# Patient Record
Sex: FEMALE | Race: ASIAN | NOT HISPANIC OR LATINO | Employment: FULL TIME | URBAN - METROPOLITAN AREA
[De-identification: names, ages, dates, MRNs, and addresses within clinical notes are randomized per-mention and may not be internally consistent; named-entity substitution may affect disease eponyms.]

---

## 2023-09-08 ENCOUNTER — HOSPITAL ENCOUNTER (OUTPATIENT)
Dept: DATA CONVERSION | Facility: HOSPITAL | Age: 63
Discharge: HOME | End: 2023-09-08
Payer: COMMERCIAL

## 2023-09-08 DIAGNOSIS — Z00.00 ENCOUNTER FOR GENERAL ADULT MEDICAL EXAMINATION WITHOUT ABNORMAL FINDINGS: ICD-10-CM

## 2023-09-08 DIAGNOSIS — I10 ESSENTIAL (PRIMARY) HYPERTENSION: ICD-10-CM

## 2023-09-08 DIAGNOSIS — E78.00 PURE HYPERCHOLESTEROLEMIA, UNSPECIFIED: ICD-10-CM

## 2023-09-08 LAB
ALBUMIN SERPL-MCNC: 4.2 GM/DL (ref 3.5–5)
ALBUMIN/GLOB SERPL: 1.2 RATIO (ref 1.5–3)
ALP BLD-CCNC: 95 U/L (ref 35–125)
ALT SERPL-CCNC: 52 U/L (ref 5–40)
ANION GAP SERPL CALCULATED.3IONS-SCNC: 13 MMOL/L (ref 0–19)
APPEARANCE PLAS: ABNORMAL
AST SERPL-CCNC: 37 U/L (ref 5–40)
BACTERIA UR QL AUTO: NEGATIVE
BASOPHILS # BLD AUTO: 0.09 K/UL (ref 0–0.22)
BASOPHILS NFR BLD AUTO: 1.2 % (ref 0–1)
BILIRUB SERPL-MCNC: 0.5 MG/DL (ref 0.1–1.2)
BILIRUB UR QL STRIP.AUTO: NEGATIVE
BUN SERPL-MCNC: 17 MG/DL (ref 8–25)
BUN/CREAT SERPL: 21.3 RATIO (ref 8–21)
CALCIUM SERPL-MCNC: 9.4 MG/DL (ref 8.5–10.4)
CHLORIDE SERPL-SCNC: 104 MMOL/L (ref 97–107)
CHOLEST SERPL-MCNC: 143 MG/DL (ref 133–200)
CHOLEST/HDLC SERPL: 3.3 RATIO
CLARITY UR: CLEAR
CO2 SERPL-SCNC: 25 MMOL/L (ref 24–31)
COLOR SPUN FLD: ABNORMAL
COLOR UR: ABNORMAL
CREAT SERPL-MCNC: 0.8 MG/DL (ref 0.4–1.6)
DEPRECATED RDW RBC AUTO: 43.3 FL (ref 37–54)
DIFFERENTIAL METHOD BLD: ABNORMAL
EOSINOPHIL # BLD AUTO: 0.31 K/UL (ref 0–0.45)
EOSINOPHIL NFR BLD: 4.2 % (ref 0–3)
ERYTHROCYTE [DISTWIDTH] IN BLOOD BY AUTOMATED COUNT: 13 % (ref 11.7–15)
FASTING STATUS PATIENT QL REPORTED: ABNORMAL
GFR SERPL CREATININE-BSD FRML MDRD: 84 ML/MIN/1.73 M2
GLOBULIN SER-MCNC: 3.6 G/DL (ref 1.9–3.7)
GLUCOSE SERPL-MCNC: 90 MG/DL (ref 65–99)
GLUCOSE UR STRIP.AUTO-MCNC: NEGATIVE MG/DL
HCT VFR BLD AUTO: 38.4 % (ref 36–44)
HDLC SERPL-MCNC: 44 MG/DL
HGB BLD-MCNC: 12.6 GM/DL (ref 12–15)
HGB UR QL STRIP.AUTO: 1 /HPF (ref 0–3)
HGB UR QL: NEGATIVE
HYALINE CASTS UR QL AUTO: ABNORMAL /LPF
IMM GRANULOCYTES # BLD AUTO: 0.02 K/UL (ref 0–0.1)
KETONES UR QL STRIP.AUTO: NEGATIVE
LDLC SERPL CALC-MCNC: 51 MG/DL (ref 65–130)
LEUKOCYTE ESTERASE UR QL STRIP.AUTO: NEGATIVE
LYMPHOCYTES # BLD AUTO: 2.8 K/UL (ref 1.2–3.2)
LYMPHOCYTES NFR BLD MANUAL: 37.7 % (ref 20–40)
MCH RBC QN AUTO: 30 PG (ref 26–34)
MCHC RBC AUTO-ENTMCNC: 32.8 % (ref 31–37)
MCV RBC AUTO: 91.4 FL (ref 80–100)
MICROSCOPIC (UA): ABNORMAL
MONOCYTES # BLD AUTO: 0.71 K/UL (ref 0–0.8)
MONOCYTES NFR BLD MANUAL: 9.6 % (ref 0–8)
NEUTROPHILS # BLD AUTO: 3.5 K/UL
NEUTROPHILS # BLD AUTO: 3.5 K/UL (ref 1.8–7.7)
NEUTROPHILS.IMMATURE NFR BLD: 0.3 % (ref 0–1)
NEUTS SEG NFR BLD: 47 % (ref 50–70)
NITRITE UR QL STRIP.AUTO: NEGATIVE
NRBC BLD-RTO: 0 /100 WBC
PH UR STRIP.AUTO: 6 [PH] (ref 4.6–8)
PLATELET # BLD AUTO: 193 K/UL (ref 150–450)
PMV BLD AUTO: 13.2 CU (ref 7–12.6)
POTASSIUM SERPL-SCNC: 4.1 MMOL/L (ref 3.4–5.1)
PROT SERPL-MCNC: 7.8 G/DL (ref 5.9–7.9)
PROT UR STRIP.AUTO-MCNC: ABNORMAL MG/DL
RBC # BLD AUTO: 4.2 M/UL (ref 4–4.9)
SODIUM SERPL-SCNC: 142 MMOL/L (ref 133–145)
SP GR UR STRIP.AUTO: 1.02 (ref 1–1.03)
SQUAMOUS UR QL AUTO: ABNORMAL /HPF
TRIGL SERPL-MCNC: 240 MG/DL (ref 40–150)
TSH SERPL DL<=0.05 MIU/L-ACNC: 1.64 MIU/L (ref 0.27–4.2)
URINE CULTURE: ABNORMAL
UROBILINOGEN UR QL STRIP.AUTO: NORMAL MG/DL (ref 0–1)
WBC # BLD AUTO: 7.4 K/UL (ref 4.5–11)
WBC #/AREA URNS AUTO: 2 /HPF (ref 0–3)

## 2023-09-15 ENCOUNTER — HOSPITAL ENCOUNTER (OUTPATIENT)
Dept: DATA CONVERSION | Facility: HOSPITAL | Age: 63
Discharge: HOME | End: 2023-09-15
Payer: COMMERCIAL

## 2023-09-15 DIAGNOSIS — Z11.51 ENCOUNTER FOR SCREENING FOR HUMAN PAPILLOMAVIRUS (HPV): ICD-10-CM

## 2023-09-25 ENCOUNTER — HOSPITAL ENCOUNTER (OUTPATIENT)
Dept: DATA CONVERSION | Facility: HOSPITAL | Age: 63
Discharge: HOME | End: 2023-09-25
Payer: COMMERCIAL

## 2023-09-25 DIAGNOSIS — Z12.31 ENCOUNTER FOR SCREENING MAMMOGRAM FOR MALIGNANT NEOPLASM OF BREAST: ICD-10-CM

## 2024-01-12 ENCOUNTER — OFFICE VISIT (OUTPATIENT)
Dept: CARDIOLOGY | Facility: CLINIC | Age: 64
End: 2024-01-12
Payer: COMMERCIAL

## 2024-01-12 VITALS
WEIGHT: 140 LBS | BODY MASS INDEX: 25.76 KG/M2 | SYSTOLIC BLOOD PRESSURE: 149 MMHG | HEIGHT: 62 IN | OXYGEN SATURATION: 98 % | DIASTOLIC BLOOD PRESSURE: 73 MMHG | RESPIRATION RATE: 18 BRPM | TEMPERATURE: 98.6 F | HEART RATE: 64 BPM

## 2024-01-12 DIAGNOSIS — I10 PRIMARY HYPERTENSION: Primary | ICD-10-CM

## 2024-01-12 PROBLEM — E78.00 HYPERCHOLESTEROLEMIA: Status: ACTIVE | Noted: 2019-02-15

## 2024-01-12 PROBLEM — E23.6 EMPTY SELLA (MULTI): Status: ACTIVE | Noted: 2022-01-11

## 2024-01-12 PROBLEM — E88.09 HYPERPROTEINEMIA: Status: ACTIVE | Noted: 2020-05-13

## 2024-01-12 PROBLEM — R73.01 IMPAIRED FASTING GLUCOSE: Status: ACTIVE | Noted: 2022-09-06

## 2024-01-12 PROBLEM — S16.1XXA CERVICAL STRAIN: Status: ACTIVE | Noted: 2024-01-12

## 2024-01-12 PROBLEM — I25.10 ARTERIOSCLEROSIS OF CORONARY ARTERY: Status: ACTIVE | Noted: 2022-09-14

## 2024-01-12 PROBLEM — E23.6 EMPTY SELLA (MULTI): Status: RESOLVED | Noted: 2022-01-11 | Resolved: 2024-01-12

## 2024-01-12 PROBLEM — M54.6 THORACIC BACK PAIN: Status: ACTIVE | Noted: 2021-01-29

## 2024-01-12 PROBLEM — S06.0X0A CONCUSSION WITHOUT LOSS OF CONSCIOUSNESS: Status: ACTIVE | Noted: 2022-01-11

## 2024-01-12 PROBLEM — S00.03XA SCALP CONTUSION: Status: ACTIVE | Noted: 2024-01-12

## 2024-01-12 PROBLEM — R94.6 ABNORMAL THYROID FUNCTION TEST: Status: ACTIVE | Noted: 2022-01-11

## 2024-01-12 PROBLEM — M19.90 INFLAMMATORY ARTHRITIS: Status: ACTIVE | Noted: 2024-01-12

## 2024-01-12 PROBLEM — S00.03XA CONTUSION OF SCALP: Status: ACTIVE | Noted: 2024-01-12

## 2024-01-12 PROBLEM — S09.90XA CLOSED HEAD INJURY: Status: ACTIVE | Noted: 2024-01-12

## 2024-01-12 PROBLEM — S16.1XXA STRAIN OF NECK MUSCLE: Status: ACTIVE | Noted: 2024-01-12

## 2024-01-12 PROCEDURE — 3077F SYST BP >= 140 MM HG: CPT | Performed by: INTERNAL MEDICINE

## 2024-01-12 PROCEDURE — 1036F TOBACCO NON-USER: CPT | Performed by: INTERNAL MEDICINE

## 2024-01-12 PROCEDURE — 3078F DIAST BP <80 MM HG: CPT | Performed by: INTERNAL MEDICINE

## 2024-01-12 PROCEDURE — 99203 OFFICE O/P NEW LOW 30 MIN: CPT | Performed by: INTERNAL MEDICINE

## 2024-01-12 PROCEDURE — 93000 ELECTROCARDIOGRAM COMPLETE: CPT | Performed by: INTERNAL MEDICINE

## 2024-01-12 RX ORDER — AMLODIPINE BESYLATE 5 MG/1
1 TABLET ORAL DAILY
COMMUNITY
Start: 2020-03-27 | End: 2024-01-12 | Stop reason: ALTCHOICE

## 2024-01-12 RX ORDER — CHOLECALCIFEROL (VITAMIN D3) 25 MCG
1000 TABLET ORAL DAILY
COMMUNITY
Start: 2023-06-05 | End: 2024-01-12 | Stop reason: ALTCHOICE

## 2024-01-12 RX ORDER — LOSARTAN POTASSIUM 100 MG/1
100 TABLET ORAL DAILY
Qty: 90 TABLET | Refills: 3 | Status: SHIPPED | OUTPATIENT
Start: 2024-01-12 | End: 2025-01-11

## 2024-01-12 RX ORDER — LOSARTAN POTASSIUM 50 MG/1
50 TABLET ORAL DAILY
COMMUNITY
Start: 2023-06-06 | End: 2024-01-12 | Stop reason: SDUPTHER

## 2024-01-12 RX ORDER — SPIRONOLACTONE 100 MG/1
100 TABLET, FILM COATED ORAL DAILY
COMMUNITY
Start: 2020-03-27 | End: 2024-01-12 | Stop reason: ALTCHOICE

## 2024-01-12 RX ORDER — ROSUVASTATIN CALCIUM 40 MG/1
1 TABLET, COATED ORAL DAILY
COMMUNITY
Start: 2022-09-14

## 2024-01-12 RX ORDER — MULTIVIT-MIN/IRON/FOLIC ACID/K 18-600-40
CAPSULE ORAL
COMMUNITY
End: 2024-01-12 | Stop reason: ALTCHOICE

## 2024-01-12 RX ORDER — METOPROLOL SUCCINATE 50 MG/1
1 TABLET, EXTENDED RELEASE ORAL DAILY
COMMUNITY

## 2024-01-12 RX ORDER — ASPIRIN 81 MG/1
81 TABLET ORAL DAILY
COMMUNITY
Start: 2020-05-13 | End: 2024-01-12 | Stop reason: ALTCHOICE

## 2024-01-12 ASSESSMENT — PAIN SCALES - GENERAL: PAINLEVEL: 0-NO PAIN

## 2024-01-12 ASSESSMENT — PATIENT HEALTH QUESTIONNAIRE - PHQ9
2. FEELING DOWN, DEPRESSED OR HOPELESS: NOT AT ALL
SUM OF ALL RESPONSES TO PHQ9 QUESTIONS 1 AND 2: 0
1. LITTLE INTEREST OR PLEASURE IN DOING THINGS: NOT AT ALL

## 2024-01-12 NOTE — PROGRESS NOTES
History of present illness:  Very pleasant 63-year-old female with history of hypertension, mild coronary artery disease based on coronary calcium score that she had in 2021 which she had total score of 60.  Patient presents to my office for hypertension.  Patient has been complaining of headaches intermittently and her blood pressure has been lately high.  She sees Dr. Lio Buenrostro as her cardiologist.  Patient denies having chest pain or shortness of breath with activity  Past Medical History:   Diagnosis Date    Abnormal thyroid function test 01/11/2022    Arteriosclerosis of coronary artery 09/14/2022    Hypercholesterolemia 02/15/2019    Hyperproteinemia 05/13/2020    Hypertension 02/15/2019       Past Surgical History:   Procedure Laterality Date    CATARACT EXTRACTION Bilateral        Allergies   Allergen Reactions    Latex Rash     Review of systems.  10 point review of systems otherwise negative     reports that she has never smoked. She has never been exposed to tobacco smoke. She has never used smokeless tobacco. She reports that she does not drink alcohol and does not use drugs.    Family History   Problem Relation Name Age of Onset    Heart disease Mother      Heart disease Father         Patient's Medications   New Prescriptions    No medications on file   Previous Medications    LOSARTAN (COZAAR) 50 MG TABLET    Take 1 tablet (50 mg) by mouth once daily.    METOPROLOL SUCCINATE XL (TOPROL-XL) 50 MG 24 HR TABLET    Take 1 tablet (50 mg) by mouth once daily.    ROSUVASTATIN (CRESTOR) 40 MG TABLET    Take 1 tablet (40 mg) by mouth once daily.   Modified Medications    No medications on file   Discontinued Medications    AMLODIPINE (NORVASC) 5 MG TABLET    Take 1 tablet (5 mg) by mouth once daily.    ASCORBIC ACID, VITAMIN C, 500 MG CAPSULE    Take by mouth.    ASPIRIN (ASPIR-LOW) 81 MG EC TABLET    Take 1 tablet (81 mg) by mouth once daily.    CALCIUM CARB/VITAMIN D3/VIT K1 (CALCIUM SOFT CHEW ORAL)     Take by mouth as directed    CHOLECALCIFEROL (VITAMIN D3) 25 MCG (1000 UT) TABLET    Take 1 tablet (1,000 Units) by mouth once daily.    SPIRONOLACTONE (ALDACTONE) 100 MG TABLET    Take 1 tablet (100 mg) by mouth once daily.       Objective   Physical Exam  General: Patient in no acute distress   HEENT: Atraumatic normocephalic.  Neck: Supple, jugular venous pressure within normal limit.  No bruits  Lungs: Clear to auscultation bilaterally  Cardiovascular: Regular rate and rhythm, normal heart sounds, no murmurs rubs or gallops  Abdomen: Soft nontender nondistended.  Normal bowel sounds.  Extremities: Warm to touch, no edema.    Lab Review   No visits with results within 2 Month(s) from this visit.   Latest known visit with results is:   [facility] Encounter on 09/15/2023   Component Date Value    CONVERTED FINAL DIAGNOSIS 09/15/2023                      Value:  SPECIMEN ADEQUACY:       Satisfactory for evaluation.         GENERAL CATEGORIZATION:       Negative for intraepithelial lesion or malignancy.            See interpretation-result.              INTERPRETATION/RESULT:      Negative for intraepithelial lesion or malignancy.           Cellular changes associated with atrophy are present.         The above diagnosis was rendered at [facility]  [address] [place] [address], CLIA number [id number].   This information is included on the report as part of a CLIA  requirement.             TONO Christine(ASCP)  09/25/2023 10:53  [facility] - 09/25/2023  Screened By:Inge Salazar M.D.      CONVERTED COMMENT 09/15/2023                      Value:The Pap test is only a screening test for cervical cancer. As with all  screening tests, false-negative results can occur, emphasizing the need  for ongoing surveillance and clinical correlation. If there are any  questions about the results of this screening test, please call the  pathology laboratory.      CONVERTED CLINICAL CYTOL* 09/15/2023                       Value:CLINICAL HISTORY:  Automatic HPV at Clinicians' Request      CONVERTED INTERPRETATION 09/15/2023                      Value:  HPV High Risk Panel & Genotyping:    Analyte                       Analysis         HPV 16 (High Risk)               NEGATIVE    HPV 18 (High Risk)               NEGATIVE    *Other HPV (High Risk)          NEGATIVE    *Other high risk types include 31, 33, 35, 39, 45, 51, 52, 56, 58, 59,  66, and 68.    Expected value Negative for all analytes.    Date:     9/20/2023         Disclaimer:  Negative Result does not preclude the presence of HPV infection because  results are dependent on adequate specimen collection, absence of  inhibitors and sufficient DNA to be detected.    The Roche Jeremy HPV Assay is a FDA cleared diagnostic test.   [facility] is authorized under CLIA to perform high complexity  testing.    TONO Christine([facility])  09/25/2023 10:54          Assessment/Plan   Patient Active Problem List   Diagnosis    Abnormal thyroid function test    Arteriosclerosis of coronary artery    Closed head injury    Concussion without loss of consciousness    Contusion of scalp    Hyperproteinemia    Hypercholesterolemia    Hypertension    Inflammatory arthritis    Strain of neck muscle    Thoracic back pain    Scalp contusion    Impaired fasting glucose    Cervical strain    Body mass index (BMI) 24.0-24.9, adult          Very pleasant 63-year-old female with history of hypertension, mild coronary artery disease based on coronary calcium score that she had in 2021 which she had total score of 60.  Patient presents to my office for hypertension.  Patient has been complaining of headaches intermittently and her blood pressure has been lately high.  She sees Dr. Lio Buenrostro as her cardiologist.  Patient denies having chest pain or shortness of breath with activity only problem she has been having intermittent headaches and hypertension.  At this point patient  currently on losartan 50 mg oral daily and addition of metoprolol.  EKG showing normal sinus rhythm nonspecific ST-T wave abnormalities.  I will increase her losartan to 100 mg oral daily.  If she remains hypertensive then we will add hydrochlorothiazide 12.5 or 25 mg.  She will call me back with the blood pressure reading in a week.  I will also refer to neurology for evaluation.  Patient will follow-up with Dr. Buenrostro in few months    Frederick Martinez MD

## 2024-01-17 ENCOUNTER — TELEPHONE (OUTPATIENT)
Dept: OPHTHALMOLOGY | Facility: CLINIC | Age: 64
End: 2024-01-17
Payer: COMMERCIAL

## 2024-01-17 NOTE — TELEPHONE ENCOUNTER
REFERRAL RECEIVED FROM NEURO. NOTES TO CLD FOR PROPER SCHEDULING-SHAQ      BUSY SIGNAL ON HOME PHONE #, UNABLE TO LM ON MOBILE #. NEED TO SCHEDULE IOP/DIL PER CLD. RECORDS IN NEURO FOLDER IN DRAWER-SHAQ

## 2024-01-22 ENCOUNTER — OFFICE VISIT (OUTPATIENT)
Dept: OPHTHALMOLOGY | Facility: CLINIC | Age: 64
End: 2024-01-22
Payer: COMMERCIAL

## 2024-01-22 DIAGNOSIS — G44.89 OTHER HEADACHE SYNDROME: Primary | ICD-10-CM

## 2024-01-22 DIAGNOSIS — Z96.1 PSEUDOPHAKIA OF BOTH EYES: ICD-10-CM

## 2024-01-22 PROCEDURE — 99213 OFFICE O/P EST LOW 20 MIN: CPT | Performed by: OPHTHALMOLOGY

## 2024-01-22 PROCEDURE — 99203 OFFICE O/P NEW LOW 30 MIN: CPT | Performed by: OPHTHALMOLOGY

## 2024-01-22 ASSESSMENT — ENCOUNTER SYMPTOMS
ALLERGIC/IMMUNOLOGIC NEGATIVE: 0
HEMATOLOGIC/LYMPHATIC NEGATIVE: 0
RESPIRATORY NEGATIVE: 0
ENDOCRINE NEGATIVE: 0
NEUROLOGICAL NEGATIVE: 1
LOSS OF SENSATION IN FEET: 0
OCCASIONAL FEELINGS OF UNSTEADINESS: 0
PSYCHIATRIC NEGATIVE: 0
CONSTITUTIONAL NEGATIVE: 0
EYES NEGATIVE: 0
GASTROINTESTINAL NEGATIVE: 0
DEPRESSION: 0
CARDIOVASCULAR NEGATIVE: 0
MUSCULOSKELETAL NEGATIVE: 0

## 2024-01-22 ASSESSMENT — CUP TO DISC RATIO
OS_RATIO: 0.4
OD_RATIO: 0.5

## 2024-01-22 ASSESSMENT — EXTERNAL EXAM - LEFT EYE: OS_EXAM: NORMAL

## 2024-01-22 ASSESSMENT — TONOMETRY
OS_IOP_MMHG: 13
OD_IOP_MMHG: 12
IOP_METHOD: GOLDMANN APPLANATION

## 2024-01-22 ASSESSMENT — PATIENT HEALTH QUESTIONNAIRE - PHQ9
1. LITTLE INTEREST OR PLEASURE IN DOING THINGS: NOT AT ALL
SUM OF ALL RESPONSES TO PHQ9 QUESTIONS 1 AND 2: 0
2. FEELING DOWN, DEPRESSED OR HOPELESS: NOT AT ALL

## 2024-01-22 ASSESSMENT — PAIN SCALES - GENERAL: PAINLEVEL: 0-NO PAIN

## 2024-01-22 ASSESSMENT — VISUAL ACUITY
OS_SC: 20/20
OD_SC: 20/20
METHOD: SNELLEN - LINEAR

## 2024-01-22 ASSESSMENT — SLIT LAMP EXAM - LIDS
COMMENTS: NORMAL
COMMENTS: NORMAL

## 2024-01-22 ASSESSMENT — EXTERNAL EXAM - RIGHT EYE: OD_EXAM: NORMAL

## 2024-01-22 NOTE — ASSESSMENT & PLAN NOTE
Stable appearing intraocular lens (IOL) both eyes (OU) with well treated PCO after YAG. Will continue to monitor with serial exam.

## 2024-01-22 NOTE — PATIENT INSTRUCTIONS
Thank you so much for choosing me to provide your care today!    If you were dilated your vision may remain blurry   or light sensitive for several hours.    The nature of eye and vision problems can require frequent follow up, please make every effort to adhere to any future appointments.    If you have any issues, questions, or concerns,   please do not hesitate to reach out.    If you receive a survey in regards to your care today, please mention any exceptional care my office staff and/or technicians provided.    You can reach our office at this number:  [phone]

## 2024-01-22 NOTE — ASSESSMENT & PLAN NOTE
No obvious ocular etiology or manifestations of HA. No signs of papilledema on exam. Advised to continue close follow up with neurology.

## 2024-01-22 NOTE — LETTER
"January 22, 2024    Mike Davis MD  [address]  [address]  [facility] [address]    Patient: Shante Nelson   YOB: 1960   Date of Visit: 1/22/2024       Dear Dr. Mike Davis MD:    Thank you for referring Shante Nelson to me for evaluation. Here is my assessment and plan of care:    Assessment/Plan:  Shante was seen today for new patient visit.  Diagnoses and all orders for this visit:  Other headache syndrome (Primary)  Pseudophakia of both eyes    No signs of papilledema on exam. No obvious ocular etiology for headache. Will have follow with you as scheduled.     Below you can find relevant pieces of the exam. If you have questions, please do not hesitate to call me. I look forward to following Shante along with you.         Sincerely,        Ray Leroy MD        CC:   No Recipients         External Exam         Right Left    External Normal Normal              Slit Lamp Exam         Right Left    Lids/Lashes Normal Normal    Conjunctiva/Sclera White and quiet White and quiet    Cornea Clear Clear    Anterior Chamber Deep and quiet Deep and quiet    Iris Round and reactive Round and reactive    Lens Posterior chamber intraocular lens, Open posterior capsule Posterior chamber intraocular lens, Open posterior capsule              Fundus Exam         Right Left    Vitreous Normal Normal    Disc Normal Normal    C/D Ratio 0.5 0.4    Macula Normal Normal    Vessels Normal Normal    Periphery Normal Temporal Abnormal pigmentation, spotted hyperpigmentation                   <div id=\"MAIN_EXAM_REVIEWED\"></div>     "

## 2024-01-22 NOTE — PROGRESS NOTES
Assessment/Plan   Problem List Items Addressed This Visit          Eye/Vision problems    Pseudophakia of both eyes     Stable appearing intraocular lens (IOL) both eyes (OU) with well treated PCO after YAG. Will continue to monitor with serial exam.             Other    Other headache syndrome - Primary     No obvious ocular etiology or manifestations of HA. No signs of papilledema on exam. Advised to continue close follow up with neurology.             Provided reassurance regarding above diagnoses and care received in the office visit today. Discussed outcomes and options along with the importance of treatment compliance. Understands the importance of any follow up visits. Patient instructed to call/communicate with our office if any new issues, questions, or concerns.     Will plan to see back in 1 year for full or sooner PRN

## 2024-08-19 ENCOUNTER — TELEPHONE (OUTPATIENT)
Dept: PRIMARY CARE | Facility: CLINIC | Age: 64
End: 2024-08-19
Payer: COMMERCIAL

## 2024-08-19 DIAGNOSIS — R94.6 ABNORMAL THYROID FUNCTION TEST: ICD-10-CM

## 2024-08-19 DIAGNOSIS — R73.01 IMPAIRED FASTING GLUCOSE: ICD-10-CM

## 2024-08-19 DIAGNOSIS — Z00.00 ROUTINE MEDICAL EXAM: ICD-10-CM

## 2024-08-19 DIAGNOSIS — Z11.4 ENCOUNTER FOR SCREENING FOR HIV: ICD-10-CM

## 2024-08-19 DIAGNOSIS — I10 HYPERTENSION, UNSPECIFIED TYPE: ICD-10-CM

## 2024-08-19 DIAGNOSIS — E78.00 HYPERCHOLESTEROLEMIA: ICD-10-CM

## 2024-08-19 DIAGNOSIS — Z11.59 NEED FOR HEPATITIS C SCREENING TEST: ICD-10-CM

## 2024-10-14 ENCOUNTER — APPOINTMENT (OUTPATIENT)
Dept: PRIMARY CARE | Facility: CLINIC | Age: 64
End: 2024-10-14
Payer: COMMERCIAL

## 2024-10-18 ENCOUNTER — LAB (OUTPATIENT)
Dept: LAB | Facility: LAB | Age: 64
End: 2024-10-18
Payer: COMMERCIAL

## 2024-10-18 DIAGNOSIS — Z11.4 ENCOUNTER FOR SCREENING FOR HIV: ICD-10-CM

## 2024-10-18 DIAGNOSIS — E78.00 HYPERCHOLESTEROLEMIA: ICD-10-CM

## 2024-10-18 DIAGNOSIS — Z11.59 NEED FOR HEPATITIS C SCREENING TEST: ICD-10-CM

## 2024-10-18 DIAGNOSIS — I25.10 ARTERIOSCLEROSIS OF CORONARY ARTERY: ICD-10-CM

## 2024-10-18 DIAGNOSIS — Z00.00 ROUTINE MEDICAL EXAM: ICD-10-CM

## 2024-10-18 DIAGNOSIS — I10 HYPERTENSION, UNSPECIFIED TYPE: ICD-10-CM

## 2024-10-18 DIAGNOSIS — R94.6 ABNORMAL THYROID FUNCTION TEST: ICD-10-CM

## 2024-10-18 DIAGNOSIS — R73.01 IMPAIRED FASTING GLUCOSE: ICD-10-CM

## 2024-10-18 LAB
ALBUMIN SERPL BCP-MCNC: 4.3 G/DL (ref 3.4–5)
ALP SERPL-CCNC: 72 U/L (ref 33–136)
ALT SERPL W P-5'-P-CCNC: 49 U/L (ref 7–45)
ANION GAP SERPL CALC-SCNC: 11 MMOL/L (ref 10–20)
APPEARANCE UR: CLEAR
AST SERPL W P-5'-P-CCNC: 37 U/L (ref 9–39)
BASOPHILS # BLD AUTO: 0.09 X10*3/UL (ref 0–0.1)
BASOPHILS NFR BLD AUTO: 1 %
BILIRUB SERPL-MCNC: 0.6 MG/DL (ref 0–1.2)
BILIRUB UR STRIP.AUTO-MCNC: NEGATIVE MG/DL
BUN SERPL-MCNC: 22 MG/DL (ref 6–23)
CALCIUM SERPL-MCNC: 9.5 MG/DL (ref 8.6–10.3)
CHLORIDE SERPL-SCNC: 104 MMOL/L (ref 98–107)
CHOLEST SERPL-MCNC: 177 MG/DL (ref 0–199)
CHOLESTEROL/HDL RATIO: 3.6
CO2 SERPL-SCNC: 29 MMOL/L (ref 21–32)
COLOR UR: NORMAL
CREAT SERPL-MCNC: 0.96 MG/DL (ref 0.5–1.05)
CREAT UR-MCNC: 99.9 MG/DL (ref 20–320)
EGFRCR SERPLBLD CKD-EPI 2021: 67 ML/MIN/1.73M*2
EOSINOPHIL # BLD AUTO: 0.3 X10*3/UL (ref 0–0.7)
EOSINOPHIL NFR BLD AUTO: 3.3 %
ERYTHROCYTE [DISTWIDTH] IN BLOOD BY AUTOMATED COUNT: 12.6 % (ref 11.5–14.5)
EST. AVERAGE GLUCOSE BLD GHB EST-MCNC: 117 MG/DL
GLUCOSE SERPL-MCNC: 94 MG/DL (ref 74–99)
GLUCOSE UR STRIP.AUTO-MCNC: NORMAL MG/DL
HBA1C MFR BLD: 5.7 %
HCT VFR BLD AUTO: 41.8 % (ref 36–46)
HCV AB SER QL: NONREACTIVE
HDLC SERPL-MCNC: 48.5 MG/DL
HGB BLD-MCNC: 13.4 G/DL (ref 12–16)
HIV 1+2 AB+HIV1 P24 AG SERPL QL IA: NONREACTIVE
IMM GRANULOCYTES # BLD AUTO: 0.03 X10*3/UL (ref 0–0.7)
IMM GRANULOCYTES NFR BLD AUTO: 0.3 % (ref 0–0.9)
KETONES UR STRIP.AUTO-MCNC: NEGATIVE MG/DL
LDLC SERPL CALC-MCNC: 86 MG/DL
LEUKOCYTE ESTERASE UR QL STRIP.AUTO: NEGATIVE
LYMPHOCYTES # BLD AUTO: 3.76 X10*3/UL (ref 1.2–4.8)
LYMPHOCYTES NFR BLD AUTO: 41.7 %
MCH RBC QN AUTO: 30 PG (ref 26–34)
MCHC RBC AUTO-ENTMCNC: 32.1 G/DL (ref 32–36)
MCV RBC AUTO: 94 FL (ref 80–100)
MICROALBUMIN UR-MCNC: 24.6 MG/L
MICROALBUMIN/CREAT UR: 24.6 UG/MG CREAT
MONOCYTES # BLD AUTO: 0.74 X10*3/UL (ref 0.1–1)
MONOCYTES NFR BLD AUTO: 8.2 %
NEUTROPHILS # BLD AUTO: 4.09 X10*3/UL (ref 1.2–7.7)
NEUTROPHILS NFR BLD AUTO: 45.5 %
NITRITE UR QL STRIP.AUTO: NEGATIVE
NON HDL CHOLESTEROL: 129 MG/DL (ref 0–149)
NRBC BLD-RTO: 0 /100 WBCS (ref 0–0)
PH UR STRIP.AUTO: 6 [PH]
PLATELET # BLD AUTO: 212 X10*3/UL (ref 150–450)
POTASSIUM SERPL-SCNC: 4.2 MMOL/L (ref 3.5–5.3)
PROT SERPL-MCNC: 7.8 G/DL (ref 6.4–8.2)
PROT UR STRIP.AUTO-MCNC: NEGATIVE MG/DL
RBC # BLD AUTO: 4.47 X10*6/UL (ref 4–5.2)
RBC # UR STRIP.AUTO: NEGATIVE /UL
SODIUM SERPL-SCNC: 140 MMOL/L (ref 136–145)
SP GR UR STRIP.AUTO: 1.02
TRIGL SERPL-MCNC: 213 MG/DL (ref 0–149)
TSH SERPL-ACNC: 1.84 MIU/L (ref 0.44–3.98)
UROBILINOGEN UR STRIP.AUTO-MCNC: NORMAL MG/DL
VLDL: 43 MG/DL (ref 0–40)
WBC # BLD AUTO: 9 X10*3/UL (ref 4.4–11.3)

## 2024-10-18 PROCEDURE — 80061 LIPID PANEL: CPT

## 2024-10-18 PROCEDURE — 80053 COMPREHEN METABOLIC PANEL: CPT

## 2024-10-18 PROCEDURE — 84443 ASSAY THYROID STIM HORMONE: CPT

## 2024-10-18 PROCEDURE — 86803 HEPATITIS C AB TEST: CPT

## 2024-10-18 PROCEDURE — 82043 UR ALBUMIN QUANTITATIVE: CPT

## 2024-10-18 PROCEDURE — 87389 HIV-1 AG W/HIV-1&-2 AB AG IA: CPT

## 2024-10-18 PROCEDURE — 85025 COMPLETE CBC W/AUTO DIFF WBC: CPT

## 2024-10-18 PROCEDURE — 82570 ASSAY OF URINE CREATININE: CPT

## 2024-10-18 PROCEDURE — 83036 HEMOGLOBIN GLYCOSYLATED A1C: CPT

## 2024-10-18 PROCEDURE — 81003 URINALYSIS AUTO W/O SCOPE: CPT

## 2024-10-23 ENCOUNTER — TELEPHONE (OUTPATIENT)
Dept: PRIMARY CARE | Facility: CLINIC | Age: 64
End: 2024-10-23

## 2024-10-23 ENCOUNTER — APPOINTMENT (OUTPATIENT)
Dept: PRIMARY CARE | Facility: CLINIC | Age: 64
End: 2024-10-23
Payer: COMMERCIAL

## 2024-10-23 VITALS
HEART RATE: 74 BPM | WEIGHT: 142 LBS | DIASTOLIC BLOOD PRESSURE: 78 MMHG | SYSTOLIC BLOOD PRESSURE: 126 MMHG | OXYGEN SATURATION: 98 % | BODY MASS INDEX: 27.88 KG/M2 | HEIGHT: 60 IN

## 2024-10-23 DIAGNOSIS — Z12.11 SCREEN FOR COLON CANCER: ICD-10-CM

## 2024-10-23 DIAGNOSIS — I10 PRIMARY HYPERTENSION: ICD-10-CM

## 2024-10-23 DIAGNOSIS — E78.00 HYPERCHOLESTEROLEMIA: ICD-10-CM

## 2024-10-23 DIAGNOSIS — Z00.00 WELL ADULT EXAM: Primary | ICD-10-CM

## 2024-10-23 DIAGNOSIS — I25.10 ARTERIOSCLEROSIS OF CORONARY ARTERY: ICD-10-CM

## 2024-10-23 DIAGNOSIS — Z12.31 ENCOUNTER FOR SCREENING MAMMOGRAM FOR BREAST CANCER: ICD-10-CM

## 2024-10-23 PROBLEM — S06.0X0A CONCUSSION WITHOUT LOSS OF CONSCIOUSNESS: Status: RESOLVED | Noted: 2022-01-11 | Resolved: 2024-10-23

## 2024-10-23 PROBLEM — G44.89 OTHER HEADACHE SYNDROME: Status: RESOLVED | Noted: 2024-01-22 | Resolved: 2024-10-23

## 2024-10-23 PROBLEM — S16.1XXA STRAIN OF NECK MUSCLE: Status: RESOLVED | Noted: 2024-01-12 | Resolved: 2024-10-23

## 2024-10-23 PROBLEM — S00.03XA SCALP CONTUSION: Status: RESOLVED | Noted: 2024-01-12 | Resolved: 2024-10-23

## 2024-10-23 PROBLEM — R94.6 ABNORMAL THYROID FUNCTION TEST: Status: RESOLVED | Noted: 2022-01-11 | Resolved: 2024-10-23

## 2024-10-23 PROBLEM — S16.1XXA CERVICAL STRAIN: Status: RESOLVED | Noted: 2024-01-12 | Resolved: 2024-10-23

## 2024-10-23 PROBLEM — S00.03XA CONTUSION OF SCALP: Status: RESOLVED | Noted: 2024-01-12 | Resolved: 2024-10-23

## 2024-10-23 PROBLEM — M54.6 THORACIC BACK PAIN: Status: RESOLVED | Noted: 2021-01-29 | Resolved: 2024-10-23

## 2024-10-23 PROBLEM — R73.01 IMPAIRED FASTING GLUCOSE: Status: RESOLVED | Noted: 2022-09-06 | Resolved: 2024-10-23

## 2024-10-23 PROBLEM — S09.90XA CLOSED HEAD INJURY: Status: RESOLVED | Noted: 2024-01-12 | Resolved: 2024-10-23

## 2024-10-23 PROCEDURE — 99396 PREV VISIT EST AGE 40-64: CPT | Performed by: FAMILY MEDICINE

## 2024-10-23 PROCEDURE — 1036F TOBACCO NON-USER: CPT | Performed by: FAMILY MEDICINE

## 2024-10-23 PROCEDURE — 3008F BODY MASS INDEX DOCD: CPT | Performed by: FAMILY MEDICINE

## 2024-10-23 PROCEDURE — 3074F SYST BP LT 130 MM HG: CPT | Performed by: FAMILY MEDICINE

## 2024-10-23 PROCEDURE — 3078F DIAST BP <80 MM HG: CPT | Performed by: FAMILY MEDICINE

## 2024-10-23 RX ORDER — CHLORTHALIDONE 25 MG/1
12.5 TABLET ORAL DAILY
COMMUNITY
Start: 2024-07-30

## 2024-10-23 ASSESSMENT — ENCOUNTER SYMPTOMS
HEMATURIA: 0
DYSPHORIC MOOD: 0
COUGH: 0
ARTHRALGIAS: 0
DYSURIA: 0
TROUBLE SWALLOWING: 0
ABDOMINAL PAIN: 0
RHINORRHEA: 0
CHILLS: 0
UNEXPECTED WEIGHT CHANGE: 0
VOMITING: 0
SHORTNESS OF BREATH: 0
NAUSEA: 0
DIZZINESS: 0
BLOOD IN STOOL: 0
SORE THROAT: 0
NUMBNESS: 0
WEAKNESS: 0
NERVOUS/ANXIOUS: 0
FEVER: 0
MYALGIAS: 0

## 2024-10-23 ASSESSMENT — PAIN SCALES - GENERAL: PAINLEVEL_OUTOF10: 0-NO PAIN

## 2024-10-23 NOTE — PROGRESS NOTES
Subjective   Patient ID: Shante Nelson is a 64 y.o. female who presents for CPE.    HPI  Patient Care Team:  Charlene Matson MD as PCP - General (Family Medicine)  Charlene Matson MD as PCP - [facility] PCP  Ray Leroy MD as Surgeon (Ophthalmology)  Frederick Martinez MD as Referring Physician (Cardiology)    Shante Nelson is seen for comprehensive physical exam.  PMH, PSH, family history and social history were reviewed and updated.  GYN - Pap 2023 negative with negative HPV  ColoGuard 2016 negative  HTN - stable on current medication, no chest pain or claudication, stress test by Dr. Buenrostro 8/2022 negative  Hypercholesterolemia - tolerating medication without side effects    Review of Systems   Constitutional:  Negative for chills, fever and unexpected weight change.   HENT:  Negative for congestion, ear pain, hearing loss, rhinorrhea, sore throat and trouble swallowing.    Eyes:  Negative for visual disturbance.   Respiratory:  Negative for cough and shortness of breath.    Cardiovascular:  Negative for chest pain and leg swelling.   Gastrointestinal:  Negative for abdominal pain, blood in stool, nausea and vomiting.   Genitourinary:  Negative for dysuria, hematuria, vaginal bleeding and vaginal discharge.   Musculoskeletal:  Negative for arthralgias and myalgias.   Skin:  Negative for rash.   Neurological:  Negative for dizziness, weakness and numbness.   Psychiatric/Behavioral:  Negative for dysphoric mood. The patient is not nervous/anxious.        Objective   /78   Pulse 74   Ht 1.524 m (5')   Wt 64.4 kg (142 lb)   SpO2 98%   BMI 27.73 kg/m²     Physical Exam  Vitals and nursing note reviewed.   Constitutional:       General: She is not in acute distress.  HENT:      Right Ear: Tympanic membrane and ear canal normal.      Left Ear: Tympanic membrane and ear canal normal.      Nose: Nose normal.      Mouth/Throat:      Mouth: Mucous membranes are moist.   Eyes:      Extraocular  Movements: Extraocular movements intact.      Pupils: Pupils are equal, round, and reactive to light.   Neck:      Vascular: No carotid bruit.   Cardiovascular:      Rate and Rhythm: Normal rate and regular rhythm.   Pulmonary:      Effort: Pulmonary effort is normal.      Breath sounds: Normal breath sounds.   Abdominal:      General: Abdomen is flat.      Palpations: Abdomen is soft.      Tenderness: There is no abdominal tenderness.   Musculoskeletal:         General: Normal range of motion.   Lymphadenopathy:      Cervical: No cervical adenopathy.   Skin:     General: Skin is warm.      Findings: No rash.   Neurological:      General: No focal deficit present.      Mental Status: She is alert.   Psychiatric:         Mood and Affect: Mood normal.         Assessment/Plan   Assessment & Plan  Well adult exam  Preventative measures discussed in detail.  Immunizations reviewed and discussed.  Reviewed labs with patient.  Screening options for colon cancer discussed in detail with patient.  She will contact her insurance to see if Cologuard is covered as the last time she had it she had to pay quite a bit of money out-of-pocket.       Encounter for screening mammogram for breast cancer    Orders:    BI mammo bilateral screening tomosynthesis; Future    Primary hypertension  Controlled.  Continue current chlorthalidone, losartan, and metoprolol as prescribed.  DASH diet. Exercise at least 4 times per week.          Hypercholesterolemia  Lab Results   Component Value Date    LDLCALC 86 10/18/2024   Well controlled.  Continue rosuvastatin.        Arteriosclerosis of coronary artery  Asymptomatic. Continue to follow up with cardiology.            Follow up  1 year , sooner with any problems or concerns.

## 2024-10-23 NOTE — TELEPHONE ENCOUNTER
Patient called  [phone] she had a CPE today, her insurance will not cover a cologard but she wants to do it anyway, please order for her

## 2024-10-23 NOTE — ASSESSMENT & PLAN NOTE
Lab Results   Component Value Date    LDLCALC 86 10/18/2024   Well controlled.  Continue rosuvastatin.

## 2024-10-23 NOTE — ASSESSMENT & PLAN NOTE
Controlled.  Continue current chlorthalidone, losartan, and metoprolol as prescribed.  DASH diet. Exercise at least 4 times per week.

## 2024-10-28 ENCOUNTER — TELEPHONE (OUTPATIENT)
Dept: PRIMARY CARE | Facility: CLINIC | Age: 64
End: 2024-10-28
Payer: COMMERCIAL

## 2024-10-28 DIAGNOSIS — I10 PRIMARY HYPERTENSION: ICD-10-CM

## 2024-10-28 RX ORDER — CHLORTHALIDONE 25 MG/1
12.5 TABLET ORAL DAILY
Qty: 45 TABLET | Refills: 1 | Status: SHIPPED | OUTPATIENT
Start: 2024-10-28 | End: 2025-04-26

## 2024-10-28 RX ORDER — LOSARTAN POTASSIUM 100 MG/1
100 TABLET ORAL DAILY
Qty: 90 TABLET | Refills: 1 | Status: SHIPPED | OUTPATIENT
Start: 2024-10-28 | End: 2025-04-26

## 2024-11-03 LAB — NONINV COLON CA DNA+OCC BLD SCRN STL QL: POSITIVE

## 2024-11-04 ENCOUNTER — TELEPHONE (OUTPATIENT)
Dept: PRIMARY CARE | Facility: CLINIC | Age: 64
End: 2024-11-04
Payer: COMMERCIAL

## 2024-11-04 DIAGNOSIS — I10 HYPERTENSION, UNSPECIFIED TYPE: ICD-10-CM

## 2024-11-04 RX ORDER — METOPROLOL SUCCINATE 50 MG/1
50 TABLET, EXTENDED RELEASE ORAL DAILY
Qty: 90 TABLET | Refills: 3 | Status: SHIPPED | OUTPATIENT
Start: 2024-11-04

## 2024-11-04 NOTE — TELEPHONE ENCOUNTER
Refill for    Metoprolol succinate XL    Pharmacy is [facility]    Patient can be reached at [phone]

## 2024-11-05 ENCOUNTER — TELEPHONE (OUTPATIENT)
Dept: PRIMARY CARE | Facility: CLINIC | Age: 64
End: 2024-11-05
Payer: COMMERCIAL

## 2024-11-05 DIAGNOSIS — R19.5 POSITIVE COLORECTAL CANCER SCREENING USING COLOGUARD TEST: ICD-10-CM

## 2024-11-05 DIAGNOSIS — I10 PRIMARY HYPERTENSION: ICD-10-CM

## 2024-11-05 RX ORDER — LOSARTAN POTASSIUM 100 MG/1
100 TABLET ORAL DAILY
Qty: 30 TABLET | Refills: 0 | Status: SHIPPED | OUTPATIENT
Start: 2024-11-05 | End: 2024-12-05

## 2024-11-05 NOTE — TELEPHONE ENCOUNTER
Patient has the cologuard results and would like to know what is the next steps now?     Also she wanted to know if she could have a 1-2 week supply for Losartan to [facility] in [place] until her delivery from [facility] comes in. Patient is out.

## 2024-11-05 NOTE — TELEPHONE ENCOUNTER
Attempted to call patient back to ask if she would like to go to [facility] or [place] for Colonoscopy. No answer, left message to call back.

## 2024-11-05 NOTE — TELEPHONE ENCOUNTER
Called patient back, I informed her of positive Cologuard result. She is aware. She states she will like to go to [facility]. Also pended 1 month supply of Losartan to local pharmacy while she [facility].

## 2024-11-12 ENCOUNTER — TELEPHONE (OUTPATIENT)
Dept: PRIMARY CARE | Facility: CLINIC | Age: 64
End: 2024-11-12
Payer: COMMERCIAL

## 2024-11-12 DIAGNOSIS — I10 PRIMARY HYPERTENSION: ICD-10-CM

## 2024-11-12 NOTE — TELEPHONE ENCOUNTER
Patient stated she never received prescription from [employer] , she would like medication Chlorthalidone 25 MG sent to a local Pharmacy.    Patient can be reached at [phone]    Pharmacy is [facility] in [place]

## 2024-11-13 RX ORDER — CHLORTHALIDONE 25 MG/1
12.5 TABLET ORAL DAILY
Qty: 45 TABLET | Refills: 1 | Status: SHIPPED | OUTPATIENT
Start: 2024-11-13 | End: 2025-05-12

## 2024-11-25 ENCOUNTER — HOSPITAL ENCOUNTER (OUTPATIENT)
Dept: RADIOLOGY | Facility: HOSPITAL | Age: 64
Discharge: HOME | End: 2024-11-25
Payer: COMMERCIAL

## 2024-11-25 VITALS — BODY MASS INDEX: 26.13 KG/M2 | HEIGHT: 62 IN | WEIGHT: 142 LBS

## 2024-11-25 DIAGNOSIS — Z12.31 ENCOUNTER FOR SCREENING MAMMOGRAM FOR BREAST CANCER: ICD-10-CM

## 2024-11-25 PROCEDURE — 77063 BREAST TOMOSYNTHESIS BI: CPT

## 2024-11-25 PROCEDURE — 77063 BREAST TOMOSYNTHESIS BI: CPT | Performed by: RADIOLOGY

## 2024-11-25 PROCEDURE — 77067 SCR MAMMO BI INCL CAD: CPT | Performed by: RADIOLOGY

## 2024-12-01 ENCOUNTER — HOSPITAL ENCOUNTER (INPATIENT)
Facility: HOSPITAL | Age: 64
DRG: 684 | End: 2024-12-01
Attending: INTERNAL MEDICINE | Admitting: INTERNAL MEDICINE
Payer: COMMERCIAL

## 2024-12-01 ENCOUNTER — HOSPITAL ENCOUNTER (EMERGENCY)
Facility: HOSPITAL | Age: 64
Discharge: ANOTHER HOSPITAL | End: 2024-12-01
Attending: EMERGENCY MEDICINE
Payer: COMMERCIAL

## 2024-12-01 ENCOUNTER — APPOINTMENT (OUTPATIENT)
Dept: RADIOLOGY | Facility: HOSPITAL | Age: 64
End: 2024-12-01
Payer: COMMERCIAL

## 2024-12-01 VITALS
TEMPERATURE: 98.4 F | RESPIRATION RATE: 18 BRPM | HEART RATE: 75 BPM | OXYGEN SATURATION: 94 % | DIASTOLIC BLOOD PRESSURE: 84 MMHG | HEIGHT: 59 IN | SYSTOLIC BLOOD PRESSURE: 130 MMHG | BODY MASS INDEX: 26.21 KG/M2 | WEIGHT: 130 LBS

## 2024-12-01 VITALS
DIASTOLIC BLOOD PRESSURE: 69 MMHG | HEIGHT: 59 IN | BODY MASS INDEX: 26.21 KG/M2 | TEMPERATURE: 98.2 F | RESPIRATION RATE: 17 BRPM | SYSTOLIC BLOOD PRESSURE: 136 MMHG | WEIGHT: 130 LBS | OXYGEN SATURATION: 100 % | HEART RATE: 71 BPM

## 2024-12-01 DIAGNOSIS — R41.0 DISORIENTATION: Primary | ICD-10-CM

## 2024-12-01 DIAGNOSIS — R41.82 ALTERED MENTAL STATUS, UNSPECIFIED: Primary | ICD-10-CM

## 2024-12-01 DIAGNOSIS — E83.52 HYPERCALCEMIA: ICD-10-CM

## 2024-12-01 PROBLEM — N17.9 ACUTE KIDNEY FAILURE: Status: ACTIVE | Noted: 2024-12-01

## 2024-12-01 PROBLEM — E87.6 HYPOKALEMIA: Status: ACTIVE | Noted: 2024-12-01

## 2024-12-01 PROBLEM — D72.829 LEUKOCYTOSIS: Status: ACTIVE | Noted: 2024-12-01

## 2024-12-01 LAB
ALBUMIN SERPL BCP-MCNC: 4.2 G/DL (ref 3.4–5)
ALP SERPL-CCNC: 71 U/L (ref 33–136)
ALT SERPL W P-5'-P-CCNC: 33 U/L (ref 7–45)
AMPHETAMINES UR QL SCN: NORMAL
ANION GAP BLDV CALCULATED.4IONS-SCNC: 10 MMOL/L (ref 10–25)
ANION GAP SERPL CALC-SCNC: 11 MMOL/L (ref 10–20)
ANION GAP SERPL CALC-SCNC: 14 MMOL/L (ref 10–20)
APPEARANCE UR: CLEAR
AST SERPL W P-5'-P-CCNC: 42 U/L (ref 9–39)
BARBITURATES UR QL SCN: NORMAL
BASE EXCESS BLDV CALC-SCNC: 1.7 MMOL/L (ref -2–3)
BASOPHILS # BLD AUTO: 0.04 X10*3/UL (ref 0–0.1)
BASOPHILS NFR BLD AUTO: 0.4 %
BENZODIAZ UR QL SCN: NORMAL
BILIRUB SERPL-MCNC: 0.7 MG/DL (ref 0–1.2)
BILIRUB UR STRIP.AUTO-MCNC: NEGATIVE MG/DL
BODY TEMPERATURE: ABNORMAL
BUN SERPL-MCNC: 19 MG/DL (ref 6–23)
BUN SERPL-MCNC: 26 MG/DL (ref 6–23)
BZE UR QL SCN: NORMAL
CA-I BLDV-SCNC: 1.3 MMOL/L (ref 1.1–1.33)
CALCIUM SERPL-MCNC: 10.4 MG/DL (ref 8.6–10.3)
CALCIUM SERPL-MCNC: 8.7 MG/DL (ref 8.6–10.3)
CANNABINOIDS UR QL SCN: NORMAL
CHLORIDE BLDV-SCNC: 106 MMOL/L (ref 98–107)
CHLORIDE SERPL-SCNC: 100 MMOL/L (ref 98–107)
CHLORIDE SERPL-SCNC: 107 MMOL/L (ref 98–107)
CO2 SERPL-SCNC: 25 MMOL/L (ref 21–32)
CO2 SERPL-SCNC: 26 MMOL/L (ref 21–32)
COLOR UR: ABNORMAL
CREAT SERPL-MCNC: 0.93 MG/DL (ref 0.5–1.05)
CREAT SERPL-MCNC: 1.16 MG/DL (ref 0.5–1.05)
CRP SERPL-MCNC: 0.24 MG/DL
EGFRCR SERPLBLD CKD-EPI 2021: 53 ML/MIN/1.73M*2
EGFRCR SERPLBLD CKD-EPI 2021: 70 ML/MIN/1.73M*2
EOSINOPHIL # BLD AUTO: 0.01 X10*3/UL (ref 0–0.7)
EOSINOPHIL NFR BLD AUTO: 0.1 %
ERYTHROCYTE [DISTWIDTH] IN BLOOD BY AUTOMATED COUNT: 12.5 % (ref 11.5–14.5)
ERYTHROCYTE [DISTWIDTH] IN BLOOD BY AUTOMATED COUNT: 12.5 % (ref 11.5–14.5)
ERYTHROCYTE [SEDIMENTATION RATE] IN BLOOD BY WESTERGREN METHOD: 19 MM/H (ref 0–30)
ETHANOL SERPL-MCNC: <10 MG/DL
FENTANYL+NORFENTANYL UR QL SCN: NORMAL
FLUAV RNA RESP QL NAA+PROBE: NOT DETECTED
FLUBV RNA RESP QL NAA+PROBE: NOT DETECTED
GLUCOSE BLD MANUAL STRIP-MCNC: 125 MG/DL (ref 74–99)
GLUCOSE BLDV-MCNC: 128 MG/DL (ref 74–99)
GLUCOSE SERPL-MCNC: 107 MG/DL (ref 74–99)
GLUCOSE SERPL-MCNC: 124 MG/DL (ref 74–99)
GLUCOSE UR STRIP.AUTO-MCNC: NORMAL MG/DL
HCO3 BLDV-SCNC: 25.8 MMOL/L (ref 22–26)
HCT VFR BLD AUTO: 34.5 % (ref 36–46)
HCT VFR BLD AUTO: 35.5 % (ref 36–46)
HCT VFR BLD EST: 37 % (ref 36–46)
HGB BLD-MCNC: 11.7 G/DL (ref 12–16)
HGB BLD-MCNC: 11.9 G/DL (ref 12–16)
HGB BLDV-MCNC: 12.3 G/DL (ref 12–16)
HOLD SPECIMEN: NORMAL
HOLD SPECIMEN: NORMAL
IMM GRANULOCYTES # BLD AUTO: 0.05 X10*3/UL (ref 0–0.7)
IMM GRANULOCYTES NFR BLD AUTO: 0.5 % (ref 0–0.9)
INHALED O2 CONCENTRATION: 21 %
KETONES UR STRIP.AUTO-MCNC: NEGATIVE MG/DL
LACTATE BLDV-SCNC: 1.2 MMOL/L (ref 0.4–2)
LACTATE SERPL-SCNC: 1.6 MMOL/L (ref 0.4–2)
LEUKOCYTE ESTERASE UR QL STRIP.AUTO: NEGATIVE
LYMPHOCYTES # BLD AUTO: 2.12 X10*3/UL (ref 1.2–4.8)
LYMPHOCYTES NFR BLD AUTO: 19.3 %
MAGNESIUM SERPL-MCNC: 2.03 MG/DL (ref 1.6–2.4)
MCH RBC QN AUTO: 30.5 PG (ref 26–34)
MCH RBC QN AUTO: 30.6 PG (ref 26–34)
MCHC RBC AUTO-ENTMCNC: 33.5 G/DL (ref 32–36)
MCHC RBC AUTO-ENTMCNC: 33.9 G/DL (ref 32–36)
MCV RBC AUTO: 90 FL (ref 80–100)
MCV RBC AUTO: 91 FL (ref 80–100)
METHADONE UR QL SCN: NORMAL
MONOCYTES # BLD AUTO: 0.92 X10*3/UL (ref 0.1–1)
MONOCYTES NFR BLD AUTO: 8.4 %
MUCOUS THREADS #/AREA URNS AUTO: NORMAL /LPF
NEUTROPHILS # BLD AUTO: 7.85 X10*3/UL (ref 1.2–7.7)
NEUTROPHILS NFR BLD AUTO: 71.3 %
NITRITE UR QL STRIP.AUTO: NEGATIVE
NRBC BLD-RTO: 0 /100 WBCS (ref 0–0)
NRBC BLD-RTO: 0 /100 WBCS (ref 0–0)
OPIATES UR QL SCN: NORMAL
OXYCODONE+OXYMORPHONE UR QL SCN: NORMAL
OXYHGB MFR BLDV: 70 % (ref 45–75)
PCO2 BLDV: 38 MM HG (ref 41–51)
PCP UR QL SCN: NORMAL
PH BLDV: 7.44 PH (ref 7.33–7.43)
PH UR STRIP.AUTO: 6 [PH]
PLATELET # BLD AUTO: 196 X10*3/UL (ref 150–450)
PLATELET # BLD AUTO: 212 X10*3/UL (ref 150–450)
PO2 BLDV: 43 MM HG (ref 35–45)
POTASSIUM BLDV-SCNC: 3.2 MMOL/L (ref 3.5–5.3)
POTASSIUM SERPL-SCNC: 3.2 MMOL/L (ref 3.5–5.3)
POTASSIUM SERPL-SCNC: 3.3 MMOL/L (ref 3.5–5.3)
PROT SERPL-MCNC: 7.8 G/DL (ref 6.4–8.2)
PROT UR STRIP.AUTO-MCNC: ABNORMAL MG/DL
RBC # BLD AUTO: 3.83 X10*6/UL (ref 4–5.2)
RBC # BLD AUTO: 3.89 X10*6/UL (ref 4–5.2)
RBC # UR STRIP.AUTO: ABNORMAL /UL
RBC #/AREA URNS AUTO: NORMAL /HPF
SAO2 % BLDV: 71 % (ref 45–75)
SARS-COV-2 RNA RESP QL NAA+PROBE: NOT DETECTED
SODIUM BLDV-SCNC: 139 MMOL/L (ref 136–145)
SODIUM SERPL-SCNC: 137 MMOL/L (ref 136–145)
SODIUM SERPL-SCNC: 140 MMOL/L (ref 136–145)
SP GR UR STRIP.AUTO: 1.02
UROBILINOGEN UR STRIP.AUTO-MCNC: NORMAL MG/DL
WBC # BLD AUTO: 11 X10*3/UL (ref 4.4–11.3)
WBC # BLD AUTO: 13.9 X10*3/UL (ref 4.4–11.3)
WBC #/AREA URNS AUTO: NORMAL /HPF

## 2024-12-01 PROCEDURE — 2500000002 HC RX 250 W HCPCS SELF ADMINISTERED DRUGS (ALT 637 FOR MEDICARE OP, ALT 636 FOR OP/ED): Performed by: EMERGENCY MEDICINE

## 2024-12-01 PROCEDURE — 2500000002 HC RX 250 W HCPCS SELF ADMINISTERED DRUGS (ALT 637 FOR MEDICARE OP, ALT 636 FOR OP/ED): Performed by: PHYSICIAN ASSISTANT

## 2024-12-01 PROCEDURE — 82077 ASSAY SPEC XCP UR&BREATH IA: CPT | Performed by: EMERGENCY MEDICINE

## 2024-12-01 PROCEDURE — 2500000004 HC RX 250 GENERAL PHARMACY W/ HCPCS (ALT 636 FOR OP/ED): Performed by: EMERGENCY MEDICINE

## 2024-12-01 PROCEDURE — 81001 URINALYSIS AUTO W/SCOPE: CPT | Performed by: EMERGENCY MEDICINE

## 2024-12-01 PROCEDURE — 83735 ASSAY OF MAGNESIUM: CPT | Performed by: EMERGENCY MEDICINE

## 2024-12-01 PROCEDURE — 36415 COLL VENOUS BLD VENIPUNCTURE: CPT | Performed by: EMERGENCY MEDICINE

## 2024-12-01 PROCEDURE — 70450 CT HEAD/BRAIN W/O DYE: CPT | Performed by: SURGERY

## 2024-12-01 PROCEDURE — 2500000004 HC RX 250 GENERAL PHARMACY W/ HCPCS (ALT 636 FOR OP/ED): Performed by: PHYSICIAN ASSISTANT

## 2024-12-01 PROCEDURE — 82947 ASSAY GLUCOSE BLOOD QUANT: CPT

## 2024-12-01 PROCEDURE — 84132 ASSAY OF SERUM POTASSIUM: CPT | Performed by: PHYSICIAN ASSISTANT

## 2024-12-01 PROCEDURE — 62270 DX LMBR SPI PNXR: CPT | Performed by: EMERGENCY MEDICINE

## 2024-12-01 PROCEDURE — 71275 CT ANGIOGRAPHY CHEST: CPT | Mod: FOREIGN READ | Performed by: RADIOLOGY

## 2024-12-01 PROCEDURE — 84132 ASSAY OF SERUM POTASSIUM: CPT | Performed by: EMERGENCY MEDICINE

## 2024-12-01 PROCEDURE — 74177 CT ABD & PELVIS W/CONTRAST: CPT | Mod: FOREIGN READ | Performed by: RADIOLOGY

## 2024-12-01 PROCEDURE — 71045 X-RAY EXAM CHEST 1 VIEW: CPT | Mod: FOREIGN READ | Performed by: RADIOLOGY

## 2024-12-01 PROCEDURE — 99285 EMERGENCY DEPT VISIT HI MDM: CPT | Mod: 25

## 2024-12-01 PROCEDURE — 85027 COMPLETE CBC AUTOMATED: CPT | Performed by: EMERGENCY MEDICINE

## 2024-12-01 PROCEDURE — 71275 CT ANGIOGRAPHY CHEST: CPT

## 2024-12-01 PROCEDURE — 71045 X-RAY EXAM CHEST 1 VIEW: CPT

## 2024-12-01 PROCEDURE — 74177 CT ABD & PELVIS W/CONTRAST: CPT

## 2024-12-01 PROCEDURE — 86140 C-REACTIVE PROTEIN: CPT | Performed by: EMERGENCY MEDICINE

## 2024-12-01 PROCEDURE — 87502 INFLUENZA DNA AMP PROBE: CPT | Performed by: EMERGENCY MEDICINE

## 2024-12-01 PROCEDURE — 94760 N-INVAS EAR/PLS OXIMETRY 1: CPT

## 2024-12-01 PROCEDURE — 80307 DRUG TEST PRSMV CHEM ANLYZR: CPT | Performed by: EMERGENCY MEDICINE

## 2024-12-01 PROCEDURE — 70450 CT HEAD/BRAIN W/O DYE: CPT

## 2024-12-01 PROCEDURE — 83970 ASSAY OF PARATHORMONE: CPT | Mod: GENLAB | Performed by: EMERGENCY MEDICINE

## 2024-12-01 PROCEDURE — 2500000001 HC RX 250 WO HCPCS SELF ADMINISTERED DRUGS (ALT 637 FOR MEDICARE OP): Performed by: PHYSICIAN ASSISTANT

## 2024-12-01 PROCEDURE — 1200000002 HC GENERAL ROOM WITH TELEMETRY DAILY

## 2024-12-01 PROCEDURE — 83605 ASSAY OF LACTIC ACID: CPT | Performed by: EMERGENCY MEDICINE

## 2024-12-01 PROCEDURE — 99223 1ST HOSP IP/OBS HIGH 75: CPT | Performed by: PHYSICIAN ASSISTANT

## 2024-12-01 PROCEDURE — 2550000001 HC RX 255 CONTRASTS: Performed by: EMERGENCY MEDICINE

## 2024-12-01 PROCEDURE — 85652 RBC SED RATE AUTOMATED: CPT | Performed by: EMERGENCY MEDICINE

## 2024-12-01 PROCEDURE — 96360 HYDRATION IV INFUSION INIT: CPT | Mod: 59

## 2024-12-01 PROCEDURE — 85025 COMPLETE CBC W/AUTO DIFF WBC: CPT | Performed by: PHYSICIAN ASSISTANT

## 2024-12-01 RX ORDER — ROSUVASTATIN CALCIUM 20 MG/1
40 TABLET, COATED ORAL DAILY
Status: DISCONTINUED | OUTPATIENT
Start: 2024-12-01 | End: 2024-12-02 | Stop reason: HOSPADM

## 2024-12-01 RX ORDER — ONDANSETRON HYDROCHLORIDE 2 MG/ML
4 INJECTION, SOLUTION INTRAVENOUS EVERY 8 HOURS PRN
Status: DISCONTINUED | OUTPATIENT
Start: 2024-12-01 | End: 2024-12-02 | Stop reason: HOSPADM

## 2024-12-01 RX ORDER — ACETAMINOPHEN 160 MG/5ML
650 SOLUTION ORAL EVERY 4 HOURS PRN
Status: DISCONTINUED | OUTPATIENT
Start: 2024-12-01 | End: 2024-12-02 | Stop reason: HOSPADM

## 2024-12-01 RX ORDER — TALC
3 POWDER (GRAM) TOPICAL NIGHTLY PRN
Status: DISCONTINUED | OUTPATIENT
Start: 2024-12-01 | End: 2024-12-02 | Stop reason: HOSPADM

## 2024-12-01 RX ORDER — ENOXAPARIN SODIUM 100 MG/ML
40 INJECTION SUBCUTANEOUS EVERY 24 HOURS
Status: DISCONTINUED | OUTPATIENT
Start: 2024-12-01 | End: 2024-12-02 | Stop reason: HOSPADM

## 2024-12-01 RX ORDER — ONDANSETRON 4 MG/1
4 TABLET, ORALLY DISINTEGRATING ORAL EVERY 8 HOURS PRN
Status: DISCONTINUED | OUTPATIENT
Start: 2024-12-01 | End: 2024-12-02 | Stop reason: HOSPADM

## 2024-12-01 RX ORDER — CHLORTHALIDONE 25 MG/1
12.5 TABLET ORAL DAILY
Status: DISCONTINUED | OUTPATIENT
Start: 2024-12-01 | End: 2024-12-02 | Stop reason: HOSPADM

## 2024-12-01 RX ORDER — ACETAMINOPHEN 650 MG/1
650 SUPPOSITORY RECTAL EVERY 4 HOURS PRN
Status: DISCONTINUED | OUTPATIENT
Start: 2024-12-01 | End: 2024-12-02 | Stop reason: HOSPADM

## 2024-12-01 RX ORDER — LOSARTAN POTASSIUM 50 MG/1
100 TABLET ORAL DAILY
Status: DISCONTINUED | OUTPATIENT
Start: 2024-12-01 | End: 2024-12-02 | Stop reason: HOSPADM

## 2024-12-01 RX ORDER — METOPROLOL SUCCINATE 50 MG/1
50 TABLET, EXTENDED RELEASE ORAL DAILY
Status: DISCONTINUED | OUTPATIENT
Start: 2024-12-01 | End: 2024-12-02 | Stop reason: HOSPADM

## 2024-12-01 RX ORDER — ACETAMINOPHEN 325 MG/1
650 TABLET ORAL EVERY 4 HOURS PRN
Status: DISCONTINUED | OUTPATIENT
Start: 2024-12-01 | End: 2024-12-02 | Stop reason: HOSPADM

## 2024-12-01 RX ORDER — POTASSIUM CHLORIDE 20 MEQ/1
40 TABLET, EXTENDED RELEASE ORAL ONCE
Status: COMPLETED | OUTPATIENT
Start: 2024-12-01 | End: 2024-12-01

## 2024-12-01 RX ADMIN — ENOXAPARIN SODIUM 40 MG: 40 INJECTION SUBCUTANEOUS at 14:35

## 2024-12-01 RX ADMIN — POTASSIUM CHLORIDE 40 MEQ: 1500 TABLET, EXTENDED RELEASE ORAL at 20:27

## 2024-12-01 RX ADMIN — METOPROLOL SUCCINATE 50 MG: 50 TABLET, EXTENDED RELEASE ORAL at 14:35

## 2024-12-01 RX ADMIN — LOSARTAN POTASSIUM 100 MG: 50 TABLET, FILM COATED ORAL at 14:35

## 2024-12-01 RX ADMIN — ROSUVASTATIN 40 MG: 20 TABLET, FILM COATED ORAL at 14:35

## 2024-12-01 SDOH — SOCIAL STABILITY: SOCIAL INSECURITY: DO YOU FEEL UNSAFE GOING BACK TO THE PLACE WHERE YOU ARE LIVING?: NO

## 2024-12-01 SDOH — SOCIAL STABILITY: SOCIAL INSECURITY: WITHIN THE LAST YEAR, HAVE YOU BEEN AFRAID OF YOUR PARTNER OR EX-PARTNER?: NO

## 2024-12-01 SDOH — SOCIAL STABILITY: SOCIAL INSECURITY
WITHIN THE LAST YEAR, HAVE YOU BEEN KICKED, HIT, SLAPPED, OR OTHERWISE PHYSICALLY HURT BY YOUR PARTNER OR EX-PARTNER?: NO

## 2024-12-01 SDOH — SOCIAL STABILITY: SOCIAL INSECURITY
WITHIN THE LAST YEAR, HAVE YOU BEEN RAPED OR FORCED TO HAVE ANY KIND OF SEXUAL ACTIVITY BY YOUR PARTNER OR EX-PARTNER?: NO

## 2024-12-01 SDOH — SOCIAL STABILITY: SOCIAL INSECURITY: WITHIN THE LAST YEAR, HAVE YOU BEEN HUMILIATED OR EMOTIONALLY ABUSED IN OTHER WAYS BY YOUR PARTNER OR EX-PARTNER?: NO

## 2024-12-01 SDOH — ECONOMIC STABILITY: FOOD INSECURITY: WITHIN THE PAST 12 MONTHS, THE FOOD YOU BOUGHT JUST DIDN'T LAST AND YOU DIDN'T HAVE MONEY TO GET MORE.: NEVER TRUE

## 2024-12-01 SDOH — ECONOMIC STABILITY: FOOD INSECURITY: WITHIN THE PAST 12 MONTHS, YOU WORRIED THAT YOUR FOOD WOULD RUN OUT BEFORE YOU GOT THE MONEY TO BUY MORE.: NEVER TRUE

## 2024-12-01 SDOH — SOCIAL STABILITY: SOCIAL INSECURITY: ARE THERE ANY APPARENT SIGNS OF INJURIES/BEHAVIORS THAT COULD BE RELATED TO ABUSE/NEGLECT?: NO

## 2024-12-01 SDOH — SOCIAL STABILITY: SOCIAL INSECURITY: HAVE YOU HAD THOUGHTS OF HARMING ANYONE ELSE?: NO

## 2024-12-01 SDOH — SOCIAL STABILITY: SOCIAL INSECURITY: ARE YOU OR HAVE YOU BEEN THREATENED OR ABUSED PHYSICALLY, EMOTIONALLY, OR SEXUALLY BY ANYONE?: NO

## 2024-12-01 SDOH — SOCIAL STABILITY: SOCIAL INSECURITY: DO YOU FEEL ANYONE HAS EXPLOITED OR TAKEN ADVANTAGE OF YOU FINANCIALLY OR OF YOUR PERSONAL PROPERTY?: NO

## 2024-12-01 SDOH — ECONOMIC STABILITY: INCOME INSECURITY: IN THE PAST 12 MONTHS HAS THE ELECTRIC, GAS, OIL, OR WATER COMPANY THREATENED TO SHUT OFF SERVICES IN YOUR HOME?: NO

## 2024-12-01 SDOH — SOCIAL STABILITY: SOCIAL INSECURITY: ABUSE: ADULT

## 2024-12-01 SDOH — SOCIAL STABILITY: SOCIAL INSECURITY: DOES ANYONE TRY TO KEEP YOU FROM HAVING/CONTACTING OTHER FRIENDS OR DOING THINGS OUTSIDE YOUR HOME?: NO

## 2024-12-01 SDOH — SOCIAL STABILITY: SOCIAL INSECURITY: HAVE YOU HAD ANY THOUGHTS OF HARMING ANYONE ELSE?: NO

## 2024-12-01 SDOH — SOCIAL STABILITY: SOCIAL INSECURITY: HAS ANYONE EVER THREATENED TO HURT YOUR FAMILY OR YOUR PETS?: NO

## 2024-12-01 ASSESSMENT — COGNITIVE AND FUNCTIONAL STATUS - GENERAL
MOBILITY SCORE: 24
PATIENT BASELINE BEDBOUND: NO
DAILY ACTIVITIY SCORE: 24

## 2024-12-01 ASSESSMENT — LIFESTYLE VARIABLES
SKIP TO QUESTIONS 9-10: 1
HOW OFTEN DO YOU HAVE 6 OR MORE DRINKS ON ONE OCCASION: NEVER
AUDIT-C TOTAL SCORE: 0
HOW MANY STANDARD DRINKS CONTAINING ALCOHOL DO YOU HAVE ON A TYPICAL DAY: PATIENT DOES NOT DRINK
PRESCIPTION_ABUSE_PAST_12_MONTHS: NO
SUBSTANCE_ABUSE_PAST_12_MONTHS: NO
HOW OFTEN DO YOU HAVE A DRINK CONTAINING ALCOHOL: NEVER
AUDIT-C TOTAL SCORE: 0

## 2024-12-01 ASSESSMENT — ACTIVITIES OF DAILY LIVING (ADL)
HEARING - RIGHT EAR: FUNCTIONAL
BATHING: INDEPENDENT
PATIENT'S MEMORY ADEQUATE TO SAFELY COMPLETE DAILY ACTIVITIES?: YES
DRESSING YOURSELF: INDEPENDENT
TOILETING: INDEPENDENT
JUDGMENT_ADEQUATE_SAFELY_COMPLETE_DAILY_ACTIVITIES: YES
ADEQUATE_TO_COMPLETE_ADL: YES
HEARING - LEFT EAR: FUNCTIONAL
LACK_OF_TRANSPORTATION: NO
GROOMING: INDEPENDENT
WALKS IN HOME: INDEPENDENT
FEEDING YOURSELF: INDEPENDENT

## 2024-12-01 ASSESSMENT — COLUMBIA-SUICIDE SEVERITY RATING SCALE - C-SSRS
6. HAVE YOU EVER DONE ANYTHING, STARTED TO DO ANYTHING, OR PREPARED TO DO ANYTHING TO END YOUR LIFE?: NO
1. IN THE PAST MONTH, HAVE YOU WISHED YOU WERE DEAD OR WISHED YOU COULD GO TO SLEEP AND NOT WAKE UP?: NO
2. HAVE YOU ACTUALLY HAD ANY THOUGHTS OF KILLING YOURSELF?: NO

## 2024-12-01 ASSESSMENT — PAIN - FUNCTIONAL ASSESSMENT: PAIN_FUNCTIONAL_ASSESSMENT: 0-10

## 2024-12-01 ASSESSMENT — PATIENT HEALTH QUESTIONNAIRE - PHQ9
SUM OF ALL RESPONSES TO PHQ9 QUESTIONS 1 & 2: 0
2. FEELING DOWN, DEPRESSED OR HOPELESS: NOT AT ALL
1. LITTLE INTEREST OR PLEASURE IN DOING THINGS: NOT AT ALL

## 2024-12-01 ASSESSMENT — PAIN DESCRIPTION - PROGRESSION: CLINICAL_PROGRESSION: NOT CHANGED

## 2024-12-01 ASSESSMENT — PAIN SCALES - GENERAL
PAINLEVEL_OUTOF10: 0 - NO PAIN
PAINLEVEL_OUTOF10: 0 - NO PAIN

## 2024-12-01 NOTE — ED NOTES
"This Rn took a call from pts friend named Narinder who called to give collateral information about pts presentation.   Per Narinder , she had take the pt home from work on Saturday morning and pt was fine, she took pt home because of snow. Narinder went back approx 11 hrs later to  pt for work, pt did not come out then did not answer door. Per Narinder she went in to get pt and stated pt was still in night clothes \" slumped over on table in kitchen\" Per Narinder pt did not know she had to work but went in to get dressed, came and stood outside and asked whe it snowed. Pt did not remember that it had snowed. Star reports that this is not patient at all. Is \" 360 different than when dropped off\" pt denied to star that she had fallen and keeps reporting \" I'm just really tired\"    This is the same complaint that pt keeps telling this RN.   Pt remains confused and seems \" out of it\"     MD made aware of collateral info      Elba Rodriguez RN  12/01/24 [id number]    "

## 2024-12-01 NOTE — H&P
History Of Present Illness  Shante Nelson is a 64 y.o. female presenting with altered mental status/confusion, hypokalemia, FREEMAN, leukocytosis, very mild hypercalcemia.  Patient was transferred from [place] to see neurology.  She has a history of coronary artery disease, hyperlipidemia, hypertension.  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  Yesterday around 1900 hrs a coworker came to pick her up to go to work but the patient did not answer the door.  Apparently she gained entrance and found the patient slumped over on the kitchen table.  Patient was still in her night close and seemed confused.  Coworker got the patient up and she did get dressed and go to work but the patient was confused at work last night and coworkers were concerned and sent her to the emergency department.  Patient apparently complained of a slight headache at [place].  A stroke alert was called but her CT was negative.  She did have a leukocytosis and they did an extensive workup however no acute process was found on CT abdomen and pelvis and chest, chest x-ray, and lab work other than decreased potassium, FREEMAN, and the leukocytosis.  Patient was transferred to see neurology here.  Patient says she feels sleepy.  Other than that she has no complaints.  She was slightly lightheaded earlier but that resolved.  She denies previous episodes.  She does not really recall any events other than the coworker finding her at the kitchen table.    Past medical history: CAD, hyperlipidemia, hypertension    Medications: Atorvastatin, losartan, metoprolol, chlorthalidone    Allergies: Latex    Social history: Denies tobacco or alcohol use    ED course: Lab work from [place] at 148 this morning showed a glucose of 124, potassium low at 3.2, BUN of 26, creatinine 1.16, GFR 53, calcium was 10.4, AST was 42.  The rest of the CMP was within normal limits.  Patient had normal renal function with a creatinine of 0.96 in October.  She did have an ALT at  that time of 49.  This morning at [place] she also had a total protein, magnesium, lactate, CRP, sed rate all within normal limits  CBC showed a white count of 13.9 with a very mild anemia.  Differential was not done.  Patient was negative for influenza and COVID  Urinalysis showed no evidence of UTI and urine toxicology screen was negative  Alcohol level was less than 10  CT head showed no acute process  Chest x-ray also showed no acute process  CT of the abdomen and pelvis and chest was unremarkable with no acute findings.  Patient was treated with a liter normal saline and 40 mill equivalents of potassium p.o. orally at [place].  EKG showed normal sinus rhythm with a heart rate of 69 and no ischemic changes  They did attempt LP x 4 but it was unsuccessful       Past Medical History:   Diagnosis Date    Abnormal thyroid function test 01/11/2022    Arteriosclerosis of coronary artery 09/14/2022    Hypercholesterolemia 02/15/2019    Hyperproteinemia 05/13/2020    Hypertension 02/15/2019     Past Surgical History:   Procedure Laterality Date    CATARACT EXTRACTION Bilateral      Social History     Tobacco Use    Smoking status: Never     Passive exposure: Never    Smokeless tobacco: Never   Substance Use Topics    Alcohol use: Never    Drug use: Never        Family History  Family History   Problem Relation Name Age of Onset    Heart disease Mother      Heart disease Father      Breast cancer Sister          Allergies  Latex    Review of Systems  Patient denies chest pain, shortness of breath, nausea, vomiting, fever, chills, diarrhea, constipation,  vision changes, rashes, dysuria, paresthesias, vertigo, headache, cough or cold symptoms, or any other complaints at this time. A complete review of systems was done, and is as stated in the history of present illness, is otherwise negative or not pertinent to the complaint.    Physical Exam  Physical exam: Vital signs and nurses notes were reviewed.    General:  no acute  distress. Alert and oriented  x 4.     Head: atraumatic and normocephalic    Eyes: Pupils equal round reactive to light, EOMs are intact, conjunctivae is not injected.    Oropharynx: no trismus or drooling, buccal mucosa is moist.    Ears:  normal external exam, no swelling or erythema,    Nasal: normal external exam,     Neck: Supple, full range of motion,     Cardiac: Regular rate and rhythm. No murmurs noted.     Pulmonary: Lungs clear bilaterally with good aeration. No adventitious breath sounds. No wheezes rales or rhonchi. No accessory muscle use no retraction noted.    Abdomen: Soft,  Nontender. No guarding, rigidity, or distention. Normoactive bowel sounds. No pulsatile masses, no bruits.  Patient had some mild abdominal pain when she sat up but her abdomen was nontender to palpation.    Extremities:  Full range of motion.  No pitting edema    Skin: No rash seen. Skin is warm and dry     Neuro: Patient is alert and oriented x4. Speech is clear. There is no asymmetry with facial grimaces, and no tongue deviation. Patient moves all extremities independently. Sensation is intact. No obvious neuro deficits are noted.  Patient had normal finger-nose bilaterally.   were strong and equal.  No tremor with holding her extremities up independently.  No nystagmus with EOMs.     Last Recorded Vitals  /75   Pulse 79   Temp 36.5 °C (97.7 °F) (Temporal)   Resp 18   Wt 59 kg (130 lb)   SpO2 92%     Relevant Results  Scheduled medications  [Held by provider] chlorthalidone, 12.5 mg, oral, Daily  enoxaparin, 40 mg, subcutaneous, q24h  losartan, 100 mg, oral, Daily  metoprolol succinate XL, 50 mg, oral, Daily  rosuvastatin, 40 mg, oral, Daily      Continuous medications     PRN medications  PRN medications: acetaminophen **OR** acetaminophen **OR** acetaminophen, melatonin, ondansetron ODT **OR** ondansetron    Results for orders placed or performed during the hospital encounter of 12/01/24 (from the past 24  hours)   POCT GLUCOSE   Result Value Ref Range    POCT Glucose 125 (H) 74 - 99 mg/dL   CBC   Result Value Ref Range    WBC 13.9 (H) 4.4 - 11.3 x10*3/uL    nRBC 0.0 0.0 - 0.0 /100 WBCs    RBC 3.89 (L) 4.00 - 5.20 x10*6/uL    Hemoglobin 11.9 (L) 12.0 - 16.0 g/dL    Hematocrit 35.5 (L) 36.0 - 46.0 %    MCV 91 80 - 100 fL    MCH 30.6 26.0 - 34.0 pg    MCHC 33.5 32.0 - 36.0 g/dL    RDW 12.5 11.5 - 14.5 %    Platelets 212 150 - 450 x10*3/uL   Comprehensive metabolic panel   Result Value Ref Range    Glucose 124 (H) 74 - 99 mg/dL    Sodium 137 136 - 145 mmol/L    Potassium 3.2 (L) 3.5 - 5.3 mmol/L    Chloride 100 98 - 107 mmol/L    Bicarbonate 26 21 - 32 mmol/L    Anion Gap 14 10 - 20 mmol/L    Urea Nitrogen 26 (H) 6 - 23 mg/dL    Creatinine 1.16 (H) 0.50 - 1.05 mg/dL    eGFR 53 (L) >60 mL/min/1.73m*2    Calcium 10.4 (H) 8.6 - 10.3 mg/dL    Albumin 4.2 3.4 - 5.0 g/dL    Alkaline Phosphatase 71 33 - 136 U/L    Total Protein 7.8 6.4 - 8.2 g/dL    AST 42 (H) 9 - 39 U/L    Bilirubin, Total 0.7 0.0 - 1.2 mg/dL    ALT 33 7 - 45 U/L   Ethanol   Result Value Ref Range    Alcohol <10 <=10 mg/dL   Lactate   Result Value Ref Range    Lactate 1.6 0.4 - 2.0 mmol/L   BLOOD GAS VENOUS FULL PANEL   Result Value Ref Range    POCT pH, Venous 7.44 (H) 7.33 - 7.43 pH    POCT pCO2, Venous 38 (L) 41 - 51 mm Hg    POCT pO2, Venous 43 35 - 45 mm Hg    POCT SO2, Venous 71 45 - 75 %    POCT Oxy Hemoglobin, Venous 70.0 45.0 - 75.0 %    POCT Hematocrit Calculated, Venous 37.0 36.0 - 46.0 %    POCT Sodium, Venous 139 136 - 145 mmol/L    POCT Potassium, Venous 3.2 (L) 3.5 - 5.3 mmol/L    POCT Chloride, Venous 106 98 - 107 mmol/L    POCT Ionized Calicum, Venous 1.30 1.10 - 1.33 mmol/L    POCT Glucose, Venous 128 (H) 74 - 99 mg/dL    POCT Lactate, Venous 1.2 0.4 - 2.0 mmol/L    POCT Base Excess, Venous 1.7 -2.0 - 3.0 mmol/L    POCT HCO3 Calculated, Venous 25.8 22.0 - 26.0 mmol/L    POCT Hemoglobin, Venous 12.3 12.0 - 16.0 g/dL    POCT Anion Gap, Venous  10.0 10.0 - 25.0 mmol/L    Patient Temperature      FiO2 21 %   Sedimentation rate, automated   Result Value Ref Range    Sedimentation Rate 19 0 - 30 mm/h   C-reactive protein   Result Value Ref Range    C-Reactive Protein 0.24 <1.00 mg/dL   Magnesium   Result Value Ref Range    Magnesium 2.03 1.60 - 2.40 mg/dL   Sars-CoV-2 PCR   Result Value Ref Range    Coronavirus 2019, PCR Not Detected Not Detected   Influenza A, and B PCR   Result Value Ref Range    Flu A Result Not Detected Not Detected    Flu B Result Not Detected Not Detected   Drug Screen, Urine   Result Value Ref Range    Amphetamine Screen, Urine Presumptive Negative Presumptive Negative    Barbiturate Screen, Urine Presumptive Negative Presumptive Negative    Benzodiazepines Screen, Urine Presumptive Negative Presumptive Negative    Cannabinoid Screen, Urine Presumptive Negative Presumptive Negative    Cocaine Metabolite Screen, Urine Presumptive Negative Presumptive Negative    Fentanyl Screen, Urine Presumptive Negative Presumptive Negative    Opiate Screen, Urine Presumptive Negative Presumptive Negative    Oxycodone Screen, Urine Presumptive Negative Presumptive Negative    PCP Screen, Urine Presumptive Negative Presumptive Negative    Methadone Screen, Urine Presumptive Negative Presumptive Negative   Urinalysis with Reflex Culture and Microscopic   Result Value Ref Range    Color, Urine Light-Yellow Light-Yellow, Yellow, Dark-Yellow    Appearance, Urine Clear Clear    Specific Gravity, Urine 1.016 1.005 - 1.035    pH, Urine 6.0 5.0, 5.5, 6.0, 6.5, 7.0, 7.5, 8.0    Protein, Urine 20 (TRACE) NEGATIVE, 10 (TRACE), 20 (TRACE) mg/dL    Glucose, Urine Normal Normal mg/dL    Blood, Urine 0.1 (1+) (A) NEGATIVE    Ketones, Urine NEGATIVE NEGATIVE mg/dL    Bilirubin, Urine NEGATIVE NEGATIVE    Urobilinogen, Urine Normal Normal mg/dL    Nitrite, Urine NEGATIVE NEGATIVE    Leukocyte Esterase, Urine NEGATIVE NEGATIVE   Extra Urine Gray Tube   Result Value Ref  Range    Extra Tube Hold for add-ons.    Urinalysis Microscopic   Result Value Ref Range    WBC, Urine 1-5 1-5, NONE /HPF    RBC, Urine 1-2 NONE, 1-2, 3-5 /HPF    Mucus, Urine FEW Reference range not established. /LPF   SST TOP   Result Value Ref Range    Extra Tube Hold for add-ons.      No orders to display          Assessment/Plan   Assessment & Plan  Altered mental status, unspecified    Leukocytosis    Hypokalemia    Acute kidney failure      Plan: Neurology consult pending  Home meds ordered but her chlorthalidone is held secondary to the FREEMAN  Repeat CBC with differential and repeat BMP were ordered stat and are pending  Daily CBC and BMP ordered  Lovenox prophylactically  Telemetry  Tylenol, melatonin, Zofran all ordered as needed  Findings, orders, plan discussed with Dr. Anglin    Note: On repeat CBC and BMP here at [facility] the patient's FREEMAN had resolved and the leukocytosis had resolved however she was still hypokalemic at 3.3, despite being given 40 mill equivalents of potassium at [place] this morning.  I ordered an additional 40 mill equivalents of potassium p.o.       Leah Bangura PA-C

## 2024-12-01 NOTE — NURSING NOTE
Patient is a transfer from [facility]. Patient was confused at work and taken to [facility] ED. Per the patient, she did not know she was being admitted. She has concerns for who can take care of her dog while she is here. Messaged Dr. Anglin about the patient and her concerns.

## 2024-12-01 NOTE — ED PROVIDER NOTES
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  Staff noticed that after she got there is 7 PM she had been acting different.  There is nothing in particular but she seemed to become more confused as the night wore on.  They became concerned, however, and called EMS for her to come to the emergency department for evaluation.  On arrival here the patient was called as a stroke alert given her confusion and went for a noncontrasted CT of the brain which was read by radiology as negative.  We reevaluated when she arrived back at the department and she had no focal deficits.  She did not perseverate but insisted that she had a headache across the front of her head and did not really remember going to work earlier in the evening.  A coworker called and let us know that she went by the patient's home yesterday to take her to work and the pt was still in house clothes, slippers, and nodding off with her head on the table. She didn't realize she needed to dress for work, then didn't know when there had been snowfall, and asked where she was. Her last known well would have been 7 PM, but her speech is normal her vision is normal and she has no focal findings on exam.  I think her confusion is due to more of a metabolic or infectious etiology.            Patient History   Past Medical History:   Diagnosis Date    Abnormal thyroid function test 01/11/2022    Arteriosclerosis of coronary artery 09/14/2022    Hypercholesterolemia 02/15/2019    Hyperproteinemia 05/13/2020    Hypertension 02/15/2019     Past Surgical History:   Procedure Laterality Date    CATARACT EXTRACTION Bilateral      Family History   Problem Relation Name Age of Onset    Heart disease Mother      Heart disease Father      Breast cancer Sister       Social History     Tobacco Use    Smoking status: Never      Passive exposure: Never    Smokeless tobacco: Never   Substance Use Topics    Alcohol use: Never    Drug use: Never       Physical Exam   ED Triage Vitals   Temp Pulse Resp BP   -- -- -- --      SpO2 Temp src Heart Rate Source Patient Position   -- -- -- --      BP Location FiO2 (%)     -- --       Physical Exam  Vitals and nursing note reviewed.   HENT:      Head: Normocephalic and atraumatic.      Right Ear: Tympanic membrane and ear canal normal.      Left Ear: Tympanic membrane and ear canal normal.      Nose: Nose normal.      Mouth/Throat:      Mouth: Mucous membranes are moist.   Eyes:      General: No visual field deficit.  Cardiovascular:      Rate and Rhythm: Normal rate.   Pulmonary:      Effort: Pulmonary effort is normal.      Breath sounds: Normal breath sounds.   Abdominal:      General: Abdomen is flat.      Palpations: Abdomen is soft.   Musculoskeletal:         General: Normal range of motion.      Cervical back: Normal range of motion.   Skin:     General: Skin is warm and dry.   Neurological:      General: No focal deficit present.      Mental Status: She is alert and oriented to person, place, and time. She is confused.      Cranial Nerves: No cranial nerve deficit, dysarthria or facial asymmetry.      Sensory: No sensory deficit.      Motor: No weakness.           ED Course & MDM                  No data recorded     [place] Coma Scale Score: 14 (12/01/24 0120 : Elba Rodriguez RN)                           Medical Decision Making  EKG interpreted by me: Sinus rhythm with rate of 69.  Erratic baseline.  No ST elevation or depression noted.  Intervals and axes normal.    Stroke score 0        We scanned the patient's head as a stroke alert and it was negative.  She had no focal deficits.  Her white count was 12.9 and her calcium was a bit high at 10.5.  We started hydrating her because of the hypercalcemia and I sent an intact parathyroid hormone level.  We also saw that she had a positive  Cologuard test in October and was scheduled for scope later this month so we scanned her chest and abdomen looking for signs of masses, tumors or metastases in these were all read as negative as well.  She had no UTI and her urine toxicology was negative.  There was no ethanol in her system.  I am not sure if this is a result of her elevated calcium but probably will need an MRI as well as the CT that was done this morning.  She is under no new stress but her  of several years did die about 3 years ago.  Her coworkers say that she really does not have any family or friends outside of them at the nursing home.    Attempted lumbar puncture x 4 with minimal amount of fluid collected.  Discontinued.        Procedure  Lumbar Puncture    Performed by: Grant De La Fuente MD  Authorized by: Grant De La Fuente MD    Consent:     Consent obtained:  Written    Consent given by:  Patient    Risks, benefits, and alternatives were discussed: yes      Risks discussed:  Infection, bleeding and pain    Alternatives discussed:  No treatment  Universal protocol:     Procedure explained and questions answered to patient or proxy's satisfaction: yes      Relevant documents present and verified: yes      Test results available: yes      Imaging studies available: yes      Required blood products, implants, devices, and special equipment available: yes      Immediately prior to procedure a time out was called: yes      Site/side marked: yes      Patient identity confirmed:  Verbally with patient  Pre-procedure details:     Procedure purpose:  Diagnostic    Preparation: Patient was prepped and draped in usual sterile fashion    Anesthesia:     Anesthesia method:  Local infiltration    Local anesthetic:  Lidocaine 1% w/o epi  Procedure details:     Lumbar space:  L4-L5 interspace    Patient position:  Sitting    Needle gauge:  20    Needle type:  Spinal needle - Quincke tip    Needle length (in):  3.5    Ultrasound guidance: no       Number of attempts:  4  Post-procedure details:     Procedure completion:  Procedure terminated at patient's request       Grant De La Fuente MD  12/01/24 0541       Grant De La Fuente MD  12/01/24 0719

## 2024-12-01 NOTE — ED TRIAGE NOTES
BIB EMS from work 2/2 AMS onset 1900 & progressing. + short term memory loss + nausea MD at [facility] EKG done

## 2024-12-01 NOTE — ED NOTES
MD at  to obtain consent for LP. This RN in room as witness.  Pt consented     Elba Rodriguez RN  12/01/24 0785

## 2024-12-02 ENCOUNTER — APPOINTMENT (OUTPATIENT)
Dept: CARDIOLOGY | Facility: HOSPITAL | Age: 64
End: 2024-12-02
Payer: COMMERCIAL

## 2024-12-02 ENCOUNTER — APPOINTMENT (OUTPATIENT)
Dept: RADIOLOGY | Facility: HOSPITAL | Age: 64
DRG: 684 | End: 2024-12-02
Payer: COMMERCIAL

## 2024-12-02 ENCOUNTER — APPOINTMENT (OUTPATIENT)
Dept: NEUROLOGY | Facility: HOSPITAL | Age: 64
DRG: 684 | End: 2024-12-02
Payer: COMMERCIAL

## 2024-12-02 VITALS
HEIGHT: 59 IN | SYSTOLIC BLOOD PRESSURE: 170 MMHG | DIASTOLIC BLOOD PRESSURE: 90 MMHG | OXYGEN SATURATION: 98 % | WEIGHT: 130 LBS | RESPIRATION RATE: 20 BRPM | BODY MASS INDEX: 26.21 KG/M2 | TEMPERATURE: 98.2 F | HEART RATE: 76 BPM

## 2024-12-02 LAB
ANION GAP SERPL CALC-SCNC: 11 MMOL/L (ref 10–20)
BUN SERPL-MCNC: 18 MG/DL (ref 6–23)
CALCIUM SERPL-MCNC: 9.1 MG/DL (ref 8.6–10.3)
CHLORIDE SERPL-SCNC: 103 MMOL/L (ref 98–107)
CO2 SERPL-SCNC: 25 MMOL/L (ref 21–32)
CREAT SERPL-MCNC: 0.98 MG/DL (ref 0.5–1.05)
EGFRCR SERPLBLD CKD-EPI 2021: 65 ML/MIN/1.73M*2
ERYTHROCYTE [DISTWIDTH] IN BLOOD BY AUTOMATED COUNT: 12.7 % (ref 11.5–14.5)
GLUCOSE SERPL-MCNC: 93 MG/DL (ref 74–99)
HCT VFR BLD AUTO: 39.4 % (ref 36–46)
HGB BLD-MCNC: 13 G/DL (ref 12–16)
MCH RBC QN AUTO: 30 PG (ref 26–34)
MCHC RBC AUTO-ENTMCNC: 33 G/DL (ref 32–36)
MCV RBC AUTO: 91 FL (ref 80–100)
NRBC BLD-RTO: 0 /100 WBCS (ref 0–0)
PLATELET # BLD AUTO: 224 X10*3/UL (ref 150–450)
POTASSIUM SERPL-SCNC: 3.8 MMOL/L (ref 3.5–5.3)
PTH-INTACT SERPL-MCNC: 11 PG/ML (ref 18.5–88)
RBC # BLD AUTO: 4.33 X10*6/UL (ref 4–5.2)
SODIUM SERPL-SCNC: 135 MMOL/L (ref 136–145)
WBC # BLD AUTO: 11.3 X10*3/UL (ref 4.4–11.3)

## 2024-12-02 PROCEDURE — 95819 EEG AWAKE AND ASLEEP: CPT | Performed by: PSYCHIATRY & NEUROLOGY

## 2024-12-02 PROCEDURE — 85027 COMPLETE CBC AUTOMATED: CPT | Performed by: PHYSICIAN ASSISTANT

## 2024-12-02 PROCEDURE — 95819 EEG AWAKE AND ASLEEP: CPT

## 2024-12-02 PROCEDURE — 2500000002 HC RX 250 W HCPCS SELF ADMINISTERED DRUGS (ALT 637 FOR MEDICARE OP, ALT 636 FOR OP/ED): Performed by: PHYSICIAN ASSISTANT

## 2024-12-02 PROCEDURE — 36415 COLL VENOUS BLD VENIPUNCTURE: CPT | Performed by: PHYSICIAN ASSISTANT

## 2024-12-02 PROCEDURE — 93005 ELECTROCARDIOGRAM TRACING: CPT

## 2024-12-02 PROCEDURE — 99239 HOSP IP/OBS DSCHRG MGMT >30: CPT | Performed by: INTERNAL MEDICINE

## 2024-12-02 PROCEDURE — 80048 BASIC METABOLIC PNL TOTAL CA: CPT | Performed by: PHYSICIAN ASSISTANT

## 2024-12-02 PROCEDURE — 70551 MRI BRAIN STEM W/O DYE: CPT

## 2024-12-02 PROCEDURE — 2500000001 HC RX 250 WO HCPCS SELF ADMINISTERED DRUGS (ALT 637 FOR MEDICARE OP): Performed by: PHYSICIAN ASSISTANT

## 2024-12-02 PROCEDURE — 70551 MRI BRAIN STEM W/O DYE: CPT | Performed by: RADIOLOGY

## 2024-12-02 RX ORDER — NAPROXEN SODIUM 220 MG/1
81 TABLET, FILM COATED ORAL DAILY
Qty: 30 TABLET | Refills: 0 | Status: SHIPPED | OUTPATIENT
Start: 2024-12-02 | End: 2025-01-01

## 2024-12-02 RX ADMIN — METOPROLOL SUCCINATE 50 MG: 50 TABLET, EXTENDED RELEASE ORAL at 08:28

## 2024-12-02 RX ADMIN — ROSUVASTATIN 40 MG: 20 TABLET, FILM COATED ORAL at 08:29

## 2024-12-02 RX ADMIN — LOSARTAN POTASSIUM 100 MG: 50 TABLET, FILM COATED ORAL at 08:28

## 2024-12-02 ASSESSMENT — COGNITIVE AND FUNCTIONAL STATUS - GENERAL
MOBILITY SCORE: 24
DAILY ACTIVITIY SCORE: 24

## 2024-12-02 ASSESSMENT — PAIN SCALES - GENERAL
PAINLEVEL_OUTOF10: 0 - NO PAIN
PAINLEVEL_OUTOF10: 0 - NO PAIN

## 2024-12-02 ASSESSMENT — PAIN - FUNCTIONAL ASSESSMENT: PAIN_FUNCTIONAL_ASSESSMENT: 0-10

## 2024-12-02 ASSESSMENT — ACTIVITIES OF DAILY LIVING (ADL): LACK_OF_TRANSPORTATION: NO

## 2024-12-02 NOTE — CARE PLAN
The patient's goals for the shift include discharge    The clinical goals for the shift include discharge

## 2024-12-02 NOTE — PROGRESS NOTES
"   12/02/24 1126   Discharge Planning   Living Arrangements Alone   Support Systems Family members;Friends/neighbors   Assistance Needed Independent prior to admission   Type of Residence Private residence   Number of Stairs to Enter Residence 0   Number of Stairs Within Residence 12   Do you have animals or pets at home? Yes   Type of Animals or Pets dog \"Shalom\"   Who is requesting discharge planning? Other (Comment)  (TCC assessment)   Home or Post Acute Services None   Expected Discharge Disposition Home   Does the patient need discharge transport arranged? No   RoundTrip coordination needed? No   Has discharge transport been arranged? No   Financial Resource Strain   How hard is it for you to pay for the very basics like food, housing, medical care, and heating? Not hard   Housing Stability   In the last 12 months, was there a time when you were not able to pay the mortgage or rent on time? N   In the past 12 months, how many times have you moved where you were living? 0   At any time in the past 12 months, were you homeless or living in a shelter (including now)? N   Transportation Needs   In the past 12 months, has lack of transportation kept you from medical appointments or from getting medications? no   In the past 12 months, has lack of transportation kept you from meetings, work, or from getting things needed for daily living? No   Stroke Family Assessment   Stroke Family Assessment Needed No   Intensity of Service   Intensity of Service 0-30 min     Met with patient at the bedside to discuss discharge plan.  She lives alone, is independent with her care including driving.  PLAN/BARRIER: echo, MRI brain  DISP: home  ADOD: 1-3 days pending test results  HHC: none  DME: none  O2: none  Wounds: none  PCP: Dr. Matson and she saw her in October 2024.  Patient denies any other needs.  She will call a friend for a ride when medically cleared.  "

## 2024-12-02 NOTE — DISCHARGE SUMMARY
Discharge Diagnosis  Altered mental status, unspecified    Issues Requiring Follow-Up      Discharge Meds     Medication List      START taking these medications     aspirin 81 mg chewable tablet; Chew 1 tablet (81 mg) once daily.     CONTINUE taking these medications     chlorthalidone 25 mg tablet; Commonly known as: Hygroton; Take 0.5   tablets (12.5 mg) by mouth once daily.   losartan 100 mg tablet; Commonly known as: Cozaar; Take 1 tablet (100   mg) by mouth once daily.   metoprolol succinate XL 50 mg 24 hr tablet; Commonly known as:   Toprol-XL; Take 1 tablet (50 mg) by mouth once daily.   rosuvastatin 40 mg tablet; Commonly known as: Crestor       Test Results Pending At Discharge  Pending Labs       No current pending labs.            Hospital Course   Shante Nelson is a 64 y.o. female presenting with altered mental status/confusion, hypokalemia, FREEMAN, leukocytosis, very mild hypercalcemia.  Patient was transferred from [place] to see neurology.  She has a history of coronary artery disease, hyperlipidemia, hypertension.  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  Yesterday around 1900 hrs a coworker came to pick her up to go to work but the patient did not answer the door.  Apparently she gained entrance and found the patient slumped over on the kitchen table.  Patient was still in her night close and seemed confused.  Coworker got the patient up and she did get dressed and go to work but the patient was confused at work last night and coworkers were concerned and sent her to the emergency department.  Patient apparently complained of a slight headache at [place].  A stroke alert was called but her CT was negative.  She did have a leukocytosis and they did an extensive workup however no acute process was found on CT abdomen and pelvis and chest, chest x-ray, and lab work other than decreased potassium, FREEMAN, and the leukocytosis.  Patient was transferred to see neurology here.  Patient says she feels  sleepy.  Other than that she has no complaints.  She was slightly lightheaded earlier but that resolved.  She denies previous episodes.  She does not really recall any events other than the coworker finding her at the kitchen table.     ED course: Lab work from [place] at 148 this morning showed a glucose of 124, potassium low at 3.2, BUN of 26, creatinine 1.16, GFR 53, calcium was 10.4, AST was 42.  The rest of the CMP was within normal limits.  Patient had normal renal function with a creatinine of 0.96 in October.  She did have an ALT at that time of 49.  This morning at [place] she also had a total protein, magnesium, lactate, CRP, sed rate all within normal limits  CBC showed a white count of 13.9 with a very mild anemia.  Differential was not done.  Patient was negative for influenza and COVID  Urinalysis showed no evidence of UTI and urine toxicology screen was negative  Alcohol level was less than 10  CT head showed no acute process  Chest x-ray also showed no acute process  CT of the abdomen and pelvis and chest was unremarkable with no acute findings.  Patient was treated with a liter normal saline and 40 mill equivalents of potassium p.o. orally at [place].  EKG showed normal sinus rhythm with a heart rate of 69 and no ischemic changes  They did attempt LP x 4 but it was unsuccessful    Patient was admitted for further management as a transfer from [facility], neurology was consulted, and MRI was recommended.  Patient symptoms resolved completely on arrival to the floor and through hospital stay with no recurrent confusion or focal deficit.  Patient had a EEG which was normal, followed by MRI with delayed study due to back orders.  She was discharged in stable condition, and called the following day, unfortunately only a voicemail was left to inform her of normal MRI.    Patient was given option to continue aspirin 81 mg daily until seen by neurologist and PCP.  Echocardiogram and Holter monitor  ordered as outpatient for further evaluation and rule out cardiogenic etiology of her symptoms.    Patient was discharged in stable condition on December 2, 2024, and advised to follow-up with neuro clinic, and her PCP.  She was to follow-up for outpatient echocardiogram and Holter monitor placement.    Greater than 30 minutes were dedicated to this discharge that included educating patient and coordinating care.    Pertinent Physical Exam At Time of Discharge  Physical Exam  Constitutional: Alert active, cooperative not in acute distress  Eyes: PERRLA, clear sclera  ENMT: Moist mucosal membranes, no exudate  Head / Neck: Atraumatic, normocephalic, supple neck, JVP not visualized  Lungs: Patent airways, CTABL  Heart: RRR, S1S2, no murmurs appreciated, palpable pulses in all extremities  GI: Soft, NT, ND, bowel sounds present in all quadrants  MSK: Moves all extremities freely, no restriction  of ROM, no joint edema  Extremities: Intact x 4, no peripheral edema  : No Arzola catheter inserted  Breast: Deferred  Neurological: AAO x 3 to person, place and date, facial muscles symmetrical, sensation intact, strength 4/4, no acute focal neurological deficits appreciated  Psychological: Appropriate mood and behavior    Outpatient Follow-Up  Future Appointments   Date Time Provider Department Center   12/4/2024 11:00 AM GEN CESIA MILESPAT [facility]   12/13/2024 10:30 AM Mohinder Mason DO [address]   1/23/2025  9:30 AM Ray Leroy MD [id number] [facility]         Miguel Anglin DO

## 2024-12-02 NOTE — PROGRESS NOTES
12/02/24 1125   [facility] Disability Status   Are you deaf or do you have serious difficulty hearing? N   Are you blind or do you have serious difficulty seeing, even when wearing glasses? N   Because of a physical, mental, or emotional condition, do you have serious difficulty concentrating, remembering, or making decisions? (5 years old or older) N   Do you have serious difficulty walking or climbing stairs? N   Do you have serious difficulty dressing or bathing? N   Because of a physical, mental, or emotional condition, do you have serious difficulty doing errands alone such as visiting the doctor? N

## 2024-12-02 NOTE — PROGRESS NOTES
Pharmacy Medication History     Additional concerns with the patient's PTA list.   Talked to pt     The following updates were made to the Prior to Admission medication list:       Allergy reviewed : Yes    Meds 2 Beds : No    Comments:      The list below reflectives the updated PTA list. Please review each medication in order reconciliation for additional clarification and justification.    Prior to Admission Medications   Prescriptions Last Dose Informant   chlorthalidone (Hygroton) 25 mg tablet 11/30/2024    Sig: Take 0.5 tablets (12.5 mg) by mouth once daily.   losartan (Cozaar) 100 mg tablet 11/30/2024    Sig: Take 1 tablet (100 mg) by mouth once daily.   metoprolol succinate XL (Toprol-XL) 50 mg 24 hr tablet 11/30/2024    Sig: Take 1 tablet (50 mg) by mouth once daily.   rosuvastatin (Crestor) 40 mg tablet 11/30/2024    Sig: Take 1 tablet (40 mg) by mouth once daily.      Facility-Administered Medications: None       The list below reflectives the updated allergy list. Please review each documented allergy for additional clarification and justification.    Allergies   Allergen Reactions    Latex Rash          12/02/24 at 12:48 PM - Consuelo DenisD

## 2024-12-02 NOTE — PROGRESS NOTES
12/02/24 1125   [facility] Disability Status   Are you deaf or do you have serious difficulty hearing? N   Are you blind or do you have serious difficulty seeing, even when wearing glasses? N   Because of a physical, mental, or emotional condition, do you have serious difficulty concentrating, remembering, or making decisions? (5 years old or older) N   Do you have serious difficulty walking or climbing stairs? N   Do you have serious difficulty dressing or bathing? N   Because of a physical, mental, or emotional condition, do you have serious difficulty doing errands alone such as visiting the doctor? N

## 2024-12-03 ENCOUNTER — HOSPITAL ENCOUNTER (EMERGENCY)
Facility: HOSPITAL | Age: 64
Discharge: HOME | End: 2024-12-03
Attending: EMERGENCY MEDICINE
Payer: COMMERCIAL

## 2024-12-03 ENCOUNTER — APPOINTMENT (OUTPATIENT)
Dept: RADIOLOGY | Facility: HOSPITAL | Age: 64
End: 2024-12-03
Payer: COMMERCIAL

## 2024-12-03 ENCOUNTER — APPOINTMENT (OUTPATIENT)
Dept: CARDIOLOGY | Facility: HOSPITAL | Age: 64
End: 2024-12-03
Payer: COMMERCIAL

## 2024-12-03 VITALS
HEIGHT: 62 IN | BODY MASS INDEX: 23.92 KG/M2 | SYSTOLIC BLOOD PRESSURE: 150 MMHG | WEIGHT: 130 LBS | TEMPERATURE: 97 F | HEART RATE: 78 BPM | DIASTOLIC BLOOD PRESSURE: 98 MMHG | OXYGEN SATURATION: 100 % | RESPIRATION RATE: 14 BRPM

## 2024-12-03 DIAGNOSIS — R07.9 CHEST PAIN, UNSPECIFIED TYPE: Primary | ICD-10-CM

## 2024-12-03 DIAGNOSIS — E87.6 HYPOKALEMIA: ICD-10-CM

## 2024-12-03 LAB
ALBUMIN SERPL BCP-MCNC: 4.8 G/DL (ref 3.4–5)
ALP SERPL-CCNC: 86 U/L (ref 33–136)
ALT SERPL W P-5'-P-CCNC: 59 U/L (ref 7–45)
ANION GAP SERPL CALC-SCNC: 15 MMOL/L (ref 10–20)
AST SERPL W P-5'-P-CCNC: 89 U/L (ref 9–39)
BASOPHILS # BLD AUTO: 0.08 X10*3/UL (ref 0–0.1)
BASOPHILS NFR BLD AUTO: 0.6 %
BILIRUB SERPL-MCNC: 0.7 MG/DL (ref 0–1.2)
BUN SERPL-MCNC: 17 MG/DL (ref 6–23)
CALCIUM SERPL-MCNC: 10 MG/DL (ref 8.6–10.3)
CARDIAC TROPONIN I PNL SERPL HS: 17 NG/L (ref 0–13)
CARDIAC TROPONIN I PNL SERPL HS: 19 NG/L (ref 0–13)
CHLORIDE SERPL-SCNC: 101 MMOL/L (ref 98–107)
CO2 SERPL-SCNC: 25 MMOL/L (ref 21–32)
CREAT SERPL-MCNC: 1.07 MG/DL (ref 0.5–1.05)
D DIMER PPP FEU-MCNC: 429 NG/ML FEU
EGFRCR SERPLBLD CKD-EPI 2021: 59 ML/MIN/1.73M*2
EOSINOPHIL # BLD AUTO: 0.04 X10*3/UL (ref 0–0.7)
EOSINOPHIL NFR BLD AUTO: 0.3 %
ERYTHROCYTE [DISTWIDTH] IN BLOOD BY AUTOMATED COUNT: 12.3 % (ref 11.5–14.5)
GLUCOSE SERPL-MCNC: 148 MG/DL (ref 74–99)
HCT VFR BLD AUTO: 42 % (ref 36–46)
HGB BLD-MCNC: 14.3 G/DL (ref 12–16)
IMM GRANULOCYTES # BLD AUTO: 0.04 X10*3/UL (ref 0–0.7)
IMM GRANULOCYTES NFR BLD AUTO: 0.3 % (ref 0–0.9)
LYMPHOCYTES # BLD AUTO: 4.23 X10*3/UL (ref 1.2–4.8)
LYMPHOCYTES NFR BLD AUTO: 31.5 %
MAGNESIUM SERPL-MCNC: 1.98 MG/DL (ref 1.6–2.4)
MCH RBC QN AUTO: 30.7 PG (ref 26–34)
MCHC RBC AUTO-ENTMCNC: 34 G/DL (ref 32–36)
MCV RBC AUTO: 90 FL (ref 80–100)
MONOCYTES # BLD AUTO: 1.08 X10*3/UL (ref 0.1–1)
MONOCYTES NFR BLD AUTO: 8 %
NEUTROPHILS # BLD AUTO: 7.96 X10*3/UL (ref 1.2–7.7)
NEUTROPHILS NFR BLD AUTO: 59.3 %
NRBC BLD-RTO: 0 /100 WBCS (ref 0–0)
PLATELET # BLD AUTO: 258 X10*3/UL (ref 150–450)
POTASSIUM SERPL-SCNC: 3.1 MMOL/L (ref 3.5–5.3)
PROT SERPL-MCNC: 8.9 G/DL (ref 6.4–8.2)
RBC # BLD AUTO: 4.66 X10*6/UL (ref 4–5.2)
SODIUM SERPL-SCNC: 138 MMOL/L (ref 136–145)
WBC # BLD AUTO: 13.4 X10*3/UL (ref 4.4–11.3)

## 2024-12-03 PROCEDURE — 71045 X-RAY EXAM CHEST 1 VIEW: CPT

## 2024-12-03 PROCEDURE — 93005 ELECTROCARDIOGRAM TRACING: CPT

## 2024-12-03 PROCEDURE — 99285 EMERGENCY DEPT VISIT HI MDM: CPT | Performed by: EMERGENCY MEDICINE

## 2024-12-03 PROCEDURE — 84484 ASSAY OF TROPONIN QUANT: CPT | Performed by: EMERGENCY MEDICINE

## 2024-12-03 PROCEDURE — 85025 COMPLETE CBC W/AUTO DIFF WBC: CPT | Performed by: EMERGENCY MEDICINE

## 2024-12-03 PROCEDURE — 36415 COLL VENOUS BLD VENIPUNCTURE: CPT | Performed by: EMERGENCY MEDICINE

## 2024-12-03 PROCEDURE — 71045 X-RAY EXAM CHEST 1 VIEW: CPT | Mod: FOREIGN READ | Performed by: RADIOLOGY

## 2024-12-03 PROCEDURE — 85379 FIBRIN DEGRADATION QUANT: CPT | Performed by: EMERGENCY MEDICINE

## 2024-12-03 PROCEDURE — 2500000002 HC RX 250 W HCPCS SELF ADMINISTERED DRUGS (ALT 637 FOR MEDICARE OP, ALT 636 FOR OP/ED): Performed by: EMERGENCY MEDICINE

## 2024-12-03 PROCEDURE — 80053 COMPREHEN METABOLIC PANEL: CPT | Performed by: EMERGENCY MEDICINE

## 2024-12-03 PROCEDURE — 83735 ASSAY OF MAGNESIUM: CPT | Performed by: EMERGENCY MEDICINE

## 2024-12-03 RX ORDER — POTASSIUM CHLORIDE 20 MEQ/1
40 TABLET, EXTENDED RELEASE ORAL ONCE
Status: COMPLETED | OUTPATIENT
Start: 2024-12-03 | End: 2024-12-03

## 2024-12-03 ASSESSMENT — HEART SCORE
RISK FACTORS: 1-2 RISK FACTORS
AGE: 45-64
ECG: NORMAL
HEART SCORE: 3
HISTORY: SLIGHTLY SUSPICIOUS
TROPONIN: 1-3 TIMES NORMAL LIMIT

## 2024-12-03 ASSESSMENT — PAIN DESCRIPTION - PAIN TYPE: TYPE: ACUTE PAIN

## 2024-12-03 ASSESSMENT — COLUMBIA-SUICIDE SEVERITY RATING SCALE - C-SSRS
6. HAVE YOU EVER DONE ANYTHING, STARTED TO DO ANYTHING, OR PREPARED TO DO ANYTHING TO END YOUR LIFE?: NO
2. HAVE YOU ACTUALLY HAD ANY THOUGHTS OF KILLING YOURSELF?: NO
1. IN THE PAST MONTH, HAVE YOU WISHED YOU WERE DEAD OR WISHED YOU COULD GO TO SLEEP AND NOT WAKE UP?: NO

## 2024-12-03 ASSESSMENT — PAIN SCALES - GENERAL
PAINLEVEL_OUTOF10: 6
PAINLEVEL_OUTOF10: 6

## 2024-12-03 ASSESSMENT — PAIN - FUNCTIONAL ASSESSMENT: PAIN_FUNCTIONAL_ASSESSMENT: 0-10

## 2024-12-03 ASSESSMENT — PAIN DESCRIPTION - LOCATION: LOCATION: CHEST

## 2024-12-03 ASSESSMENT — PAIN DESCRIPTION - DESCRIPTORS: DESCRIPTORS: ACHING

## 2024-12-04 ENCOUNTER — PRE-ADMISSION TESTING (OUTPATIENT)
Dept: PREADMISSION TESTING | Facility: HOSPITAL | Age: 64
End: 2024-12-04
Payer: COMMERCIAL

## 2024-12-04 ENCOUNTER — PATIENT OUTREACH (OUTPATIENT)
Dept: PRIMARY CARE | Facility: CLINIC | Age: 64
End: 2024-12-04
Payer: COMMERCIAL

## 2024-12-04 ENCOUNTER — APPOINTMENT (OUTPATIENT)
Dept: RADIOLOGY | Facility: HOSPITAL | Age: 64
End: 2024-12-04
Payer: COMMERCIAL

## 2024-12-04 ENCOUNTER — HOSPITAL ENCOUNTER (EMERGENCY)
Facility: HOSPITAL | Age: 64
Discharge: HOME | End: 2024-12-05
Attending: EMERGENCY MEDICINE
Payer: COMMERCIAL

## 2024-12-04 ENCOUNTER — APPOINTMENT (OUTPATIENT)
Dept: CARDIOLOGY | Facility: HOSPITAL | Age: 64
End: 2024-12-04
Payer: COMMERCIAL

## 2024-12-04 DIAGNOSIS — D72.829 LEUKOCYTOSIS: ICD-10-CM

## 2024-12-04 DIAGNOSIS — E78.00 HYPERCHOLESTEROLEMIA: ICD-10-CM

## 2024-12-04 DIAGNOSIS — R20.2 PARESTHESIA: ICD-10-CM

## 2024-12-04 DIAGNOSIS — M19.90 INFLAMMATORY ARTHRITIS: ICD-10-CM

## 2024-12-04 DIAGNOSIS — R00.2 PALPITATIONS: ICD-10-CM

## 2024-12-04 DIAGNOSIS — Z96.1 PSEUDOPHAKIA OF BOTH EYES: ICD-10-CM

## 2024-12-04 DIAGNOSIS — E88.09 HYPERPROTEINEMIA: ICD-10-CM

## 2024-12-04 DIAGNOSIS — I10 HYPERTENSION: ICD-10-CM

## 2024-12-04 DIAGNOSIS — N17.9 ACUTE KIDNEY FAILURE: Primary | ICD-10-CM

## 2024-12-04 DIAGNOSIS — I25.10 ARTERIOSCLEROSIS OF CORONARY ARTERY: ICD-10-CM

## 2024-12-04 DIAGNOSIS — R41.82 ALTERED MENTAL STATUS, UNSPECIFIED: ICD-10-CM

## 2024-12-04 DIAGNOSIS — E87.6 HYPOKALEMIA: ICD-10-CM

## 2024-12-04 LAB
ALBUMIN SERPL BCP-MCNC: 4.4 G/DL (ref 3.4–5)
ALP SERPL-CCNC: 74 U/L (ref 33–136)
ALT SERPL W P-5'-P-CCNC: 61 U/L (ref 7–45)
ANION GAP SERPL CALC-SCNC: 14 MMOL/L (ref 10–20)
AST SERPL W P-5'-P-CCNC: 90 U/L (ref 9–39)
ATRIAL RATE: 73 BPM
ATRIAL RATE: 77 BPM
BASOPHILS # BLD AUTO: 0.07 X10*3/UL (ref 0–0.1)
BASOPHILS NFR BLD AUTO: 0.7 %
BILIRUB SERPL-MCNC: 0.6 MG/DL (ref 0–1.2)
BUN SERPL-MCNC: 17 MG/DL (ref 6–23)
CALCIUM SERPL-MCNC: 9.4 MG/DL (ref 8.6–10.3)
CARDIAC TROPONIN I PNL SERPL HS: 12 NG/L (ref 0–13)
CHLORIDE SERPL-SCNC: 102 MMOL/L (ref 98–107)
CO2 SERPL-SCNC: 25 MMOL/L (ref 21–32)
CREAT SERPL-MCNC: 1.1 MG/DL (ref 0.5–1.05)
EGFRCR SERPLBLD CKD-EPI 2021: 57 ML/MIN/1.73M*2
EOSINOPHIL # BLD AUTO: 0.18 X10*3/UL (ref 0–0.7)
EOSINOPHIL NFR BLD AUTO: 1.8 %
ERYTHROCYTE [DISTWIDTH] IN BLOOD BY AUTOMATED COUNT: 12.4 % (ref 11.5–14.5)
GLUCOSE SERPL-MCNC: 112 MG/DL (ref 74–99)
HCT VFR BLD AUTO: 38.1 % (ref 36–46)
HGB BLD-MCNC: 12.7 G/DL (ref 12–16)
IMM GRANULOCYTES # BLD AUTO: 0.02 X10*3/UL (ref 0–0.7)
IMM GRANULOCYTES NFR BLD AUTO: 0.2 % (ref 0–0.9)
LYMPHOCYTES # BLD AUTO: 2.82 X10*3/UL (ref 1.2–4.8)
LYMPHOCYTES NFR BLD AUTO: 27.7 %
MAGNESIUM SERPL-MCNC: 2.09 MG/DL (ref 1.6–2.4)
MCH RBC QN AUTO: 30.6 PG (ref 26–34)
MCHC RBC AUTO-ENTMCNC: 33.3 G/DL (ref 32–36)
MCV RBC AUTO: 92 FL (ref 80–100)
MONOCYTES # BLD AUTO: 1.03 X10*3/UL (ref 0.1–1)
MONOCYTES NFR BLD AUTO: 10.1 %
NEUTROPHILS # BLD AUTO: 6.05 X10*3/UL (ref 1.2–7.7)
NEUTROPHILS NFR BLD AUTO: 59.5 %
NRBC BLD-RTO: 0 /100 WBCS (ref 0–0)
P AXIS: 24 DEGREES
P AXIS: 33 DEGREES
P OFFSET: 198 MS
P OFFSET: 204 MS
P ONSET: 153 MS
P ONSET: 156 MS
PLATELET # BLD AUTO: 229 X10*3/UL (ref 150–450)
POTASSIUM SERPL-SCNC: 4.1 MMOL/L (ref 3.5–5.3)
PR INTERVAL: 126 MS
PR INTERVAL: 130 MS
PROT SERPL-MCNC: 8.3 G/DL (ref 6.4–8.2)
Q ONSET: 218 MS
Q ONSET: 219 MS
QRS COUNT: 12 BEATS
QRS COUNT: 13 BEATS
QRS DURATION: 76 MS
QRS DURATION: 78 MS
QT INTERVAL: 390 MS
QT INTERVAL: 400 MS
QTC CALCULATION(BAZETT): 440 MS
QTC CALCULATION(BAZETT): 441 MS
QTC FREDERICIA: 423 MS
QTC FREDERICIA: 427 MS
R AXIS: 52 DEGREES
R AXIS: 59 DEGREES
RBC # BLD AUTO: 4.15 X10*6/UL (ref 4–5.2)
SODIUM SERPL-SCNC: 137 MMOL/L (ref 136–145)
T AXIS: 23 DEGREES
T AXIS: 38 DEGREES
T OFFSET: 413 MS
T OFFSET: 419 MS
VENTRICULAR RATE: 73 BPM
VENTRICULAR RATE: 77 BPM
WBC # BLD AUTO: 10.2 X10*3/UL (ref 4.4–11.3)

## 2024-12-04 PROCEDURE — 80053 COMPREHEN METABOLIC PANEL: CPT | Performed by: EMERGENCY MEDICINE

## 2024-12-04 PROCEDURE — 70450 CT HEAD/BRAIN W/O DYE: CPT | Performed by: RADIOLOGY

## 2024-12-04 PROCEDURE — 83735 ASSAY OF MAGNESIUM: CPT | Performed by: EMERGENCY MEDICINE

## 2024-12-04 PROCEDURE — 36415 COLL VENOUS BLD VENIPUNCTURE: CPT | Performed by: EMERGENCY MEDICINE

## 2024-12-04 PROCEDURE — 99284 EMERGENCY DEPT VISIT MOD MDM: CPT | Mod: 25 | Performed by: EMERGENCY MEDICINE

## 2024-12-04 PROCEDURE — 85025 COMPLETE CBC W/AUTO DIFF WBC: CPT | Performed by: EMERGENCY MEDICINE

## 2024-12-04 PROCEDURE — 70450 CT HEAD/BRAIN W/O DYE: CPT

## 2024-12-04 PROCEDURE — 84484 ASSAY OF TROPONIN QUANT: CPT | Performed by: EMERGENCY MEDICINE

## 2024-12-04 PROCEDURE — 93005 ELECTROCARDIOGRAM TRACING: CPT

## 2024-12-04 ASSESSMENT — PAIN SCALES - GENERAL: PAINLEVEL_OUTOF10: 0 - NO PAIN

## 2024-12-04 ASSESSMENT — COLUMBIA-SUICIDE SEVERITY RATING SCALE - C-SSRS
2. HAVE YOU ACTUALLY HAD ANY THOUGHTS OF KILLING YOURSELF?: NO
1. IN THE PAST MONTH, HAVE YOU WISHED YOU WERE DEAD OR WISHED YOU COULD GO TO SLEEP AND NOT WAKE UP?: NO
6. HAVE YOU EVER DONE ANYTHING, STARTED TO DO ANYTHING, OR PREPARED TO DO ANYTHING TO END YOUR LIFE?: NO

## 2024-12-04 ASSESSMENT — PAIN - FUNCTIONAL ASSESSMENT: PAIN_FUNCTIONAL_ASSESSMENT: 0-10

## 2024-12-04 NOTE — ED TRIAGE NOTES
"Pt to ED via EMS from home c/o L sided chest pain x 4 hours, no shortness of breath N/V dizziness, EKG performed shows NSR, EMS gave 1 nitro that improved chest pain slightly, pt took 4 baby aspirins at home, IV inserted and blood drawn by EMS, placed on cardiac monitor and cont pulse ox, pt was recently discharged from [facility] where she was admitted for \"confusion\", pt is A&Ox4 for this RN   "

## 2024-12-04 NOTE — PREPROCEDURE INSTRUCTIONS
Medication List            Accurate as of December 4, 2024 11:04 AM. Always use your most recent med list.                aspirin 81 mg chewable tablet  Chew 1 tablet (81 mg) once daily.  Medication Adjustments for Surgery: Do Not take on the morning of surgery     chlorthalidone 25 mg tablet  Commonly known as: Hygroton  Take 0.5 tablets (12.5 mg) by mouth once daily.  Medication Adjustments for Surgery: Do Not take on the morning of surgery     losartan 100 mg tablet  Commonly known as: Cozaar  Take 1 tablet (100 mg) by mouth once daily.  Medication Adjustments for Surgery: Do Not take on the morning of surgery     metoprolol succinate XL 50 mg 24 hr tablet  Commonly known as: Toprol-XL  Take 1 tablet (50 mg) by mouth once daily.  Medication Adjustments for Surgery: Take on the morning of surgery     rosuvastatin 40 mg tablet  Commonly known as: Crestor  Medication Adjustments for Surgery: Do Not take on the morning of surgery                     Patient states that Dr. Buenrostro retired and is in need of new cardiologist.  Appt scheduled with Mery Waters in [place] 1/21/25..   CHAVA and holter scheduled for 12/18/24 in [place].    Reschedule colonoscopy with Dr. Mason [phone] after CHAVA, monitor and cardiology appointment have been completed.    Dr. Mason's office is aware and will call patient to reschedule colonoscopy.

## 2024-12-04 NOTE — PROGRESS NOTES
Discharge Facility: [facility]  Discharge Diagnosis: Altered mental status  Admission Date: 12/1/2024  Discharge Date: 12/2/2024    Pt was seen in [facility] ED 12/3/2024 for chest pain.    PCP Appointment Date:  -12/16/2024 0900; task sent to office clerical pool to attempt to schedule sooner    Specialist Appointment Date:   -12/4/2024 1100 pre-admission testing  -12/13/2024 1030 colonoscopy    Hospital Encounter and Summary Linked: Yes    Unable to reach patient x2 attempts, voicemail left with call back number.

## 2024-12-04 NOTE — ED PROVIDER NOTES
HPI   Chief Complaint   Patient presents with    Chest Pain       HPI  Patient is a 64-year-old female presenting to the ED today for chest pain.  Patient states that she started developing left-sided chest pain yesterday.  The pain has been constant since then, and slightly worsening in intensity, prompting her to call EMS today.  Patient took aspirin 325 mg p.o. prior to arrival.  EMS also administered 1 sublingual nitro with improvement of her pain.  Patient otherwise denies any recent fever, cough, shortness of breath.  She does note that she was recently discharged from [facility] yesterday where she was admitted for confusion.  She denies any new confusion since then.  She denies any abdominal pain, vomiting, or changes in bowel or bladder habits.  Patient reports a history of hypertension and hypercholesterolemia.  She denies any previous cardiac history.  She follows with Dr. Buenrostro, cardiology.      Patient History   Past Medical History:   Diagnosis Date    Abnormal thyroid function test 01/11/2022    Arteriosclerosis of coronary artery 09/14/2022    Hypercholesterolemia 02/15/2019    Hyperproteinemia 05/13/2020    Hypertension 02/15/2019     Past Surgical History:   Procedure Laterality Date    CATARACT EXTRACTION Bilateral      Family History   Problem Relation Name Age of Onset    Heart disease Mother      Heart disease Father      Breast cancer Sister       Social History     Tobacco Use    Smoking status: Never     Passive exposure: Never    Smokeless tobacco: Never   Substance Use Topics    Alcohol use: Never    Drug use: Never       Physical Exam   ED Triage Vitals [12/03/24 2125]   Temperature Heart Rate Respirations BP   36.1 °C (97 °F) 86 16 167/85      Pulse Ox Temp Source Heart Rate Source Patient Position   98 % Temporal -- --      BP Location FiO2 (%)     -- --       Physical Exam  Vitals and nursing note reviewed.   Constitutional:       General: She is not in acute distress.     Appearance:  She is not toxic-appearing.   HENT:      Head: Normocephalic.      Mouth/Throat:      Mouth: Mucous membranes are moist.   Eyes:      Extraocular Movements: Extraocular movements intact.      Conjunctiva/sclera: Conjunctivae normal.   Cardiovascular:      Rate and Rhythm: Normal rate and regular rhythm.      Pulses: Normal pulses.   Pulmonary:      Effort: Pulmonary effort is normal. No respiratory distress.      Breath sounds: Normal breath sounds. No wheezing.   Abdominal:      General: There is no distension.      Palpations: Abdomen is soft.      Tenderness: There is no abdominal tenderness.   Musculoskeletal:         General: No swelling.      Cervical back: Neck supple.   Skin:     General: Skin is warm and dry.      Capillary Refill: Capillary refill takes less than 2 seconds.   Neurological:      General: No focal deficit present.      Mental Status: She is alert. Mental status is at baseline.           ED Course & MDM   ED Course as of 12/04/24 0016   Tu Dec 03, 2024   2136 EKG obtained at 2127, interpreted by myself.  Normal sinus rhythm with a ventricular rate of 77, no axis deviation, normal intervals, with no acute ischemic changes [VT]   2304 EKG obtained at 2300, interpreted by myself.  Normal sinus rhythm with a ventricular rate of 73, no axis deviation, normal intervals, with no acute ischemic changes [VT]      ED Course User Index  [VT] Maritza RAMOS MD         Diagnoses as of 12/04/24 0016   Chest pain, unspecified type   Hypokalemia             No data recorded                             Medical Decision Making  Patient was seen and evaluated for chest pain.  Differential diagnosis includes but is not limited to ACS, PE, AAA, Unstable angina, Aortic Dissection, GERD, Trauma, Viral Infection, MSK Pain, Costochondritis, Chest wall pain, COPD, CHF.  Initial EKG does not show any acute ischemic changes.  Patient is placed on a cardiac monitor with continuous pulse ox.  Additional labs and imaging  are ordered for further evaluation of the patient's symptoms.    CBC shows mild leukocytosis with WBC of 13.4, otherwise unremarkable.  CMP shows mild hypokalemia with potassium of 3.1.  This is replaced with 40 mill equivalents KCl orally.  D-dimer is normal.  Magnesium is normal at 1.98.  Initial high sensitive troponin is slightly elevated at 19, though repeat is stable and decreasing at 17.    XR chest 1 view   Final Result   No acute cardiopulmonary disease.  No significant interval change   compared to the prior exam..   Signed by Jack Duffy MD        On reevaluation, patient is resting comfortably in bed.  Patient has a heart score of 3.  Patient was informed of their lab and imaging results, and all questions and concerns were answered. Discharge planning with close outpatient follow-up was discussed at this time, to which the patient was agreeable. Strict return precautions were given, and patient was discharged home in stable condition.      Procedure  Procedures     Maritza RAMOS MD  12/04/24 0018

## 2024-12-05 ENCOUNTER — HOSPITAL ENCOUNTER (OUTPATIENT)
Dept: CARDIOLOGY | Facility: HOSPITAL | Age: 64
Discharge: HOME | End: 2024-12-05
Payer: COMMERCIAL

## 2024-12-05 VITALS
WEIGHT: 130 LBS | OXYGEN SATURATION: 98 % | DIASTOLIC BLOOD PRESSURE: 70 MMHG | TEMPERATURE: 97.3 F | SYSTOLIC BLOOD PRESSURE: 126 MMHG | BODY MASS INDEX: 23.92 KG/M2 | RESPIRATION RATE: 17 BRPM | HEART RATE: 73 BPM | HEIGHT: 62 IN

## 2024-12-05 LAB
ATRIAL RATE: 65 BPM
ATRIAL RATE: 68 BPM
HOLD SPECIMEN: NORMAL
P AXIS: 29 DEGREES
P AXIS: 31 DEGREES
P OFFSET: 203 MS
P OFFSET: 203 MS
P ONSET: 152 MS
P ONSET: 154 MS
PR INTERVAL: 130 MS
PR INTERVAL: 134 MS
Q ONSET: 219 MS
Q ONSET: 219 MS
QRS COUNT: 11 BEATS
QRS COUNT: 12 BEATS
QRS DURATION: 74 MS
QRS DURATION: 78 MS
QT INTERVAL: 402 MS
QT INTERVAL: 424 MS
QTC CALCULATION(BAZETT): 427 MS
QTC CALCULATION(BAZETT): 440 MS
QTC FREDERICIA: 419 MS
QTC FREDERICIA: 435 MS
R AXIS: 54 DEGREES
R AXIS: 57 DEGREES
T AXIS: 38 DEGREES
T AXIS: 46 DEGREES
T OFFSET: 420 MS
T OFFSET: 431 MS
VENTRICULAR RATE: 65 BPM
VENTRICULAR RATE: 68 BPM

## 2024-12-05 PROCEDURE — 93005 ELECTROCARDIOGRAM TRACING: CPT

## 2024-12-05 NOTE — ED PROVIDER NOTES
"HPI   Chief Complaint   Patient presents with    Numbness     Patient states she started having \" numbness and tingling\" on her left side from the left side of her face down to her arm, chest and leg. Has been to the ER yesterday and the day before       HPI  Patient is a 64-year-old female presenting to the ED today for palpitations now with tingling to the left side of her body.  Patient was recently seen here in the ED yesterday for palpitations.  Her workup at that time was unremarkable, and patient was discharged home.  Patient notes that today, she started having tingling to the entire left side of her body.  She describes tingling particularly in her left chest associated with her palpitations, but also tingling to her left face, arm, left chest, left abdomen/torso, and left leg.  She denies any difficulty with ambulation.  She denies any associated weakness.  She has no additional concerns.  She notes that she continues to have palpitations from when she was seen yesterday.      Patient History   Past Medical History:   Diagnosis Date    Abnormal thyroid function test 01/11/2022    Arteriosclerosis of coronary artery 09/14/2022    Hypercholesterolemia 02/15/2019    Hyperproteinemia 05/13/2020    Hypertension 02/15/2019     Past Surgical History:   Procedure Laterality Date    CATARACT EXTRACTION Bilateral      Family History   Problem Relation Name Age of Onset    Heart disease Mother      Heart disease Father      Breast cancer Sister       Social History     Tobacco Use    Smoking status: Never     Passive exposure: Never    Smokeless tobacco: Never   Vaping Use    Vaping status: Never Used   Substance Use Topics    Alcohol use: Never    Drug use: Never       Physical Exam   ED Triage Vitals [12/04/24 2134]   Temperature Heart Rate Respirations BP   36.2 °C (97.1 °F) 66 16 162/80      Pulse Ox Temp Source Heart Rate Source Patient Position   96 % Oral Monitor Sitting      BP Location FiO2 (%)     Right arm " --       Physical Exam  Vitals and nursing note reviewed.   Constitutional:       General: She is not in acute distress.     Appearance: She is not toxic-appearing.   HENT:      Head: Normocephalic.      Mouth/Throat:      Mouth: Mucous membranes are moist.   Eyes:      Extraocular Movements: Extraocular movements intact.      Conjunctiva/sclera: Conjunctivae normal.   Cardiovascular:      Rate and Rhythm: Normal rate and regular rhythm.      Pulses: Normal pulses.   Pulmonary:      Effort: Pulmonary effort is normal. No respiratory distress.      Breath sounds: Normal breath sounds. No wheezing.   Abdominal:      General: There is no distension.      Palpations: Abdomen is soft.      Tenderness: There is no abdominal tenderness.   Musculoskeletal:         General: No swelling.      Cervical back: Neck supple.   Skin:     General: Skin is warm and dry.      Capillary Refill: Capillary refill takes less than 2 seconds.   Neurological:      General: No focal deficit present.      Mental Status: She is alert. Mental status is at baseline.      Comments: This patient is awake, alert and oriented to person, place and time. Speech is clear and fluent. Cranial nerves II-XII are grossly intact. Strength and sensation are intact in all extremities. No limb ataxia. No pronator drift. No dysmetria detected on finger-to-nose test. NIHSS 0.             ED Course & MDM   ED Course as of 12/05/24 0326   Wed Dec 04, 2024   2231 EKG obtained at 2134, interpreted by myself.  Normal sinus rhythm with a ventricular rate of 68, no axis deviation, normal intervals, with no acute ischemic changes [VT]   2231 EKG obtained at 2222, interpreted by myself.  Normal sinus rhythm with a ventricular rate of 65, no axis deviation, normal intervals, with no acute ischemic changes [VT]      ED Course User Index  [VT] Maritza RAMOS MD         Diagnoses as of 12/05/24 0326   Paresthesia   Palpitations           No data recorded             NIH Stroke  Scale: 0 (12/04/24 2210 : Collette Romo RN)               Medical Decision Making  Patient was seen and evaluated for palpitations as well as tingling to the entire left side of her body.  On exam however, she has no focal numbness or weakness.  NIH stroke scale is 0.  On arrival, vital signs are unremarkable.  Additional lab work and imaging are ordered for further evaluation of her symptoms.    CBC is unremarkable.  CMP is unremarkable.  High-sensitivity troponin is normal at 12.  Magnesium is normal at 2.  CT head wo IV contrast   Final Result   No evidence of acute cortical infarct or intracranial hemorrhage.        Senescent changes. No acute findings. If persistent concern for   intracranial pathology, consider MRI        MACRO:   None        Signed by: Mohinder Daigle 12/5/2024 12:09 AM   Dictation workstation:   [id number]        On reevaluation, patient is resting comfortably in bed. Patient was informed of their lab and imaging results, and all questions and concerns were answered.   We discussed admission for further evaluation versus discharge home with close outpatient follow-up.  Patient states that she already has an outpatient echocardiogram scheduled in about 1 week.  She also had a recent MRI of her head done at [facility] during her previous hospitalization several days ago.  Given this, patient would prefer to be discharged home as she has pets to take care of.  She would not like to be admitted at this time.  Therefore, discharge planning with close outpatient follow-up was discussed, to which the patient was agreeable. Strict return precautions were given, and patient was discharged home in stable condition.    Procedure  Procedures     Maritza RAMOS MD  12/05/24 0339

## 2024-12-05 NOTE — ED TRIAGE NOTES
"Patient states she started having \" numbness and tingling\" on her left side from the left side of her face down to her arm, chest and leg. Has been to the ER yesterday and the day before   "

## 2024-12-06 ENCOUNTER — TELEPHONE (OUTPATIENT)
Dept: PRIMARY CARE | Facility: CLINIC | Age: 64
End: 2024-12-06
Payer: COMMERCIAL

## 2024-12-06 DIAGNOSIS — I10 PRIMARY HYPERTENSION: ICD-10-CM

## 2024-12-06 RX ORDER — LOSARTAN POTASSIUM 100 MG/1
100 TABLET ORAL DAILY
Qty: 90 TABLET | Refills: 0 | Status: SHIPPED | OUTPATIENT
Start: 2024-12-06 | End: 2025-03-06

## 2024-12-06 NOTE — TELEPHONE ENCOUNTER
Med refill on   losartan (Cozaar) 100 mg tablet   Send to [facility] in [place]  Last seen on 10/23/2024  Pt call back [phone]

## 2024-12-08 LAB
ATRIAL RATE: 69 BPM
P AXIS: 39 DEGREES
P OFFSET: 200 MS
P ONSET: 152 MS
PR INTERVAL: 136 MS
Q ONSET: 220 MS
QRS COUNT: 11 BEATS
QRS DURATION: 78 MS
QT INTERVAL: 410 MS
QTC CALCULATION(BAZETT): 439 MS
QTC FREDERICIA: 429 MS
R AXIS: 72 DEGREES
T AXIS: 66 DEGREES
T OFFSET: 425 MS
VENTRICULAR RATE: 69 BPM

## 2024-12-12 ENCOUNTER — APPOINTMENT (OUTPATIENT)
Dept: PRIMARY CARE | Facility: CLINIC | Age: 64
End: 2024-12-12
Payer: COMMERCIAL

## 2024-12-13 ENCOUNTER — APPOINTMENT (OUTPATIENT)
Dept: GASTROENTEROLOGY | Facility: HOSPITAL | Age: 64
End: 2024-12-13
Payer: COMMERCIAL

## 2024-12-13 LAB
ATRIAL RATE: 73 BPM
ATRIAL RATE: 77 BPM
P AXIS: 24 DEGREES
P AXIS: 33 DEGREES
P OFFSET: 198 MS
P OFFSET: 204 MS
P ONSET: 153 MS
P ONSET: 156 MS
PR INTERVAL: 126 MS
PR INTERVAL: 130 MS
Q ONSET: 218 MS
Q ONSET: 219 MS
QRS COUNT: 12 BEATS
QRS COUNT: 13 BEATS
QRS DURATION: 76 MS
QRS DURATION: 78 MS
QT INTERVAL: 390 MS
QT INTERVAL: 400 MS
QTC CALCULATION(BAZETT): 440 MS
QTC CALCULATION(BAZETT): 441 MS
QTC FREDERICIA: 423 MS
QTC FREDERICIA: 427 MS
R AXIS: 52 DEGREES
R AXIS: 59 DEGREES
T AXIS: 23 DEGREES
T AXIS: 38 DEGREES
T OFFSET: 413 MS
T OFFSET: 419 MS
VENTRICULAR RATE: 73 BPM
VENTRICULAR RATE: 77 BPM

## 2024-12-14 LAB
ATRIAL RATE: 65 BPM
ATRIAL RATE: 68 BPM
P AXIS: 29 DEGREES
P AXIS: 31 DEGREES
P OFFSET: 203 MS
P OFFSET: 203 MS
P ONSET: 152 MS
P ONSET: 154 MS
PR INTERVAL: 130 MS
PR INTERVAL: 134 MS
Q ONSET: 219 MS
Q ONSET: 219 MS
QRS COUNT: 11 BEATS
QRS COUNT: 12 BEATS
QRS DURATION: 74 MS
QRS DURATION: 78 MS
QT INTERVAL: 402 MS
QT INTERVAL: 424 MS
QTC CALCULATION(BAZETT): 427 MS
QTC CALCULATION(BAZETT): 440 MS
QTC FREDERICIA: 419 MS
QTC FREDERICIA: 435 MS
R AXIS: 54 DEGREES
R AXIS: 57 DEGREES
T AXIS: 38 DEGREES
T AXIS: 46 DEGREES
T OFFSET: 420 MS
T OFFSET: 431 MS
VENTRICULAR RATE: 65 BPM
VENTRICULAR RATE: 68 BPM

## 2024-12-16 ENCOUNTER — PATIENT OUTREACH (OUTPATIENT)
Dept: PRIMARY CARE | Facility: CLINIC | Age: 64
End: 2024-12-16

## 2024-12-16 ENCOUNTER — APPOINTMENT (OUTPATIENT)
Dept: PRIMARY CARE | Facility: CLINIC | Age: 64
End: 2024-12-16
Payer: COMMERCIAL

## 2024-12-17 ENCOUNTER — APPOINTMENT (OUTPATIENT)
Dept: PRIMARY CARE | Facility: CLINIC | Age: 64
End: 2024-12-17
Payer: COMMERCIAL

## 2024-12-18 ENCOUNTER — HOSPITAL ENCOUNTER (OUTPATIENT)
Dept: CARDIOLOGY | Facility: CLINIC | Age: 64
Discharge: HOME | End: 2024-12-18
Payer: COMMERCIAL

## 2024-12-18 ENCOUNTER — OFFICE VISIT (OUTPATIENT)
Dept: PRIMARY CARE | Facility: CLINIC | Age: 64
End: 2024-12-18
Payer: COMMERCIAL

## 2024-12-18 VITALS
HEIGHT: 62 IN | BODY MASS INDEX: 25.76 KG/M2 | SYSTOLIC BLOOD PRESSURE: 144 MMHG | OXYGEN SATURATION: 98 % | WEIGHT: 140 LBS | HEART RATE: 61 BPM | DIASTOLIC BLOOD PRESSURE: 86 MMHG

## 2024-12-18 DIAGNOSIS — R74.8 ELEVATED LIVER ENZYMES: ICD-10-CM

## 2024-12-18 DIAGNOSIS — Z09 HOSPITAL DISCHARGE FOLLOW-UP: ICD-10-CM

## 2024-12-18 DIAGNOSIS — Z13.6 ENCOUNTER FOR SCREENING FOR CARDIOVASCULAR DISORDERS: ICD-10-CM

## 2024-12-18 DIAGNOSIS — R40.4 TRANSIENT ALTERATION OF AWARENESS: Primary | ICD-10-CM

## 2024-12-18 DIAGNOSIS — E87.1 HYPONATREMIA: ICD-10-CM

## 2024-12-18 DIAGNOSIS — R41.82 ALTERED MENTAL STATUS, UNSPECIFIED: ICD-10-CM

## 2024-12-18 DIAGNOSIS — E87.6 HYPOKALEMIA: ICD-10-CM

## 2024-12-18 DIAGNOSIS — R07.9 CHEST PAIN, UNSPECIFIED TYPE: ICD-10-CM

## 2024-12-18 LAB — BODY SURFACE AREA: 1.67 M2

## 2024-12-18 PROCEDURE — 3077F SYST BP >= 140 MM HG: CPT

## 2024-12-18 PROCEDURE — 99495 TRANSJ CARE MGMT MOD F2F 14D: CPT

## 2024-12-18 PROCEDURE — 93306 TTE W/DOPPLER COMPLETE: CPT | Performed by: INTERNAL MEDICINE

## 2024-12-18 PROCEDURE — 93246 EXT ECG>7D<15D RECORDING: CPT

## 2024-12-18 PROCEDURE — 93306 TTE W/DOPPLER COMPLETE: CPT

## 2024-12-18 PROCEDURE — 3079F DIAST BP 80-89 MM HG: CPT

## 2024-12-18 PROCEDURE — 3008F BODY MASS INDEX DOCD: CPT

## 2024-12-18 ASSESSMENT — COLUMBIA-SUICIDE SEVERITY RATING SCALE - C-SSRS
1. IN THE PAST MONTH, HAVE YOU WISHED YOU WERE DEAD OR WISHED YOU COULD GO TO SLEEP AND NOT WAKE UP?: NO
2. HAVE YOU ACTUALLY HAD ANY THOUGHTS OF KILLING YOURSELF?: NO
6. HAVE YOU EVER DONE ANYTHING, STARTED TO DO ANYTHING, OR PREPARED TO DO ANYTHING TO END YOUR LIFE?: NO

## 2024-12-18 ASSESSMENT — VISUAL ACUITY: OU: 1

## 2024-12-18 ASSESSMENT — PATIENT HEALTH QUESTIONNAIRE - PHQ9
SUM OF ALL RESPONSES TO PHQ9 QUESTIONS 1 AND 2: 0
1. LITTLE INTEREST OR PLEASURE IN DOING THINGS: NOT AT ALL
2. FEELING DOWN, DEPRESSED OR HOPELESS: NOT AT ALL

## 2024-12-18 NOTE — PROGRESS NOTES
"Patient: Shante Nelson  : 1960  PCP: Charlene Matson MD  MRN: [id number]  Program: Transitional Care Management  Status: Enrolled  Effective Dates: 2024 - present  Responsible Staff: Josette Emery RN  Social Drivers to be Addressed: Physical Activity, Social Connections, Stress         Shante Nelson is a 64 y.o. female presenting today for follow-up after being discharged from the ER 14 days ago. The main problem requiring evaluation was altered mental status on 2024 (discharged 2024), chest pain 12/3/2024, and paresthesias of the left side on 2024. The discharge summary and/or Transitional Care Management documentation was reviewed. Medication reconciliation was performed as indicated via the \"Moise as Reviewed\" timestamp.     Shante Nelson was contacted by Transitional Care Management services two days after her discharge. This encounter and supporting documentation was reviewed.    Shante presents today as follow up to multiple ER visits recently.  Admits that she is has been more run down lately.  She admits experiencing altered mental status a work on  resulting in EMS transfer to ER and admission to [facility] for observations, CT and MRI both negative for CVA- neuro clear patient and she was subsequently discharged on 2024.  She was found to have abnormal labs including hypokalemia, FREEMAN, leukocytosis, very mild hypercalcemia. Neuro follow up scheduled for 2024.    She then began to experience chest pains on 12/3/2024, resulting in second ER visit. Cardiac work up unremarkable, her symptoms resolved and she was discharged to home. She is scheduled to follow up with cardiology on 2025.  Previously a patient of Dr. Almodovar, recently retired. She has an appointment for Halter monitor today and ECHO cardiogram. On 2024, she began experiencing numbness and tingling on the left side of her face, left chest  and down her left arm.  ER work up showed no abnormal findings, " "her symptoms have since resolved.   She is no longer experiencing altered mental status, chest pains or paresthesias.  She is experiencing intermittent chest palpitations without chest pain and reports a pulsation sensation at the top of her head. She admits that these symptoms are associated with increased stress for her.  She is scheduled for Halter monitor and ECHO today.      Patient's recent visit notes, medication and allergy lists, past medical surgical social hx, immunization, vitals, problem list, recent tests were reviewed by me for pertinence to this visit.    Review of Systems  See below for HPI. All other ROS were reviewed with patient and were negative        /86 (BP Location: Left arm, Patient Position: Sitting, BP Cuff Size: Adult)   Pulse 61   Ht 1.575 m (5' 2\")   Wt 63.5 kg (140 lb)   SpO2 98%   BMI 25.61 kg/m²     Physical Exam  Vitals and nursing note reviewed.   Constitutional:       General: She is not in acute distress.     Appearance: Normal appearance. She is well-developed, well-groomed and normal weight. She is not ill-appearing.   HENT:      Head: Normocephalic.      Right Ear: Tympanic membrane, ear canal and external ear normal.      Left Ear: Tympanic membrane, ear canal and external ear normal.      Nose: Nose normal.      Mouth/Throat:      Lips: Pink.      Mouth: Mucous membranes are moist.      Pharynx: Oropharynx is clear. Uvula midline.   Eyes:      General: Lids are normal. Vision grossly intact. Gaze aligned appropriately.      Extraocular Movements: Extraocular movements intact.      Conjunctiva/sclera: Conjunctivae normal.      Pupils: Pupils are equal, round, and reactive to light.   Neck:      Thyroid: No thyromegaly or thyroid tenderness.      Vascular: No carotid bruit or JVD.      Trachea: Trachea and phonation normal.   Cardiovascular:      Rate and Rhythm: Normal rate and regular rhythm.      Heart sounds: Normal heart sounds, S1 normal and S2 normal. "   Pulmonary:      Effort: Pulmonary effort is normal.      Breath sounds: Normal breath sounds and air entry. No decreased breath sounds, wheezing, rhonchi or rales.   Musculoskeletal:      Cervical back: Full passive range of motion without pain, normal range of motion and neck supple.      Right lower leg: No edema.      Left lower leg: No edema.   Lymphadenopathy:      Cervical: No cervical adenopathy.   Skin:     General: Skin is warm and dry.      Capillary Refill: Capillary refill takes less than 2 seconds.   Neurological:      General: No focal deficit present.      Mental Status: She is alert and oriented to person, place, and time.      Cranial Nerves: Cranial nerves 2-12 are intact.      Sensory: Sensation is intact.      Motor: Motor function is intact.      Coordination: Coordination is intact.      Gait: Gait is intact.   Psychiatric:         Attention and Perception: Attention normal.         Speech: Speech normal.         Behavior: Behavior normal. Behavior is cooperative.             Assessment/Plan   Assessment & Plan  Transient alteration of awareness  Acute problem which is now resolved.   Neuro exam unremarkable at this visit.  Patient describes no further episodes or instances of altered mental status since discharge on 12/2/2024. Will plan to repeat labs as they were found to be abnormal at that visit, including decreased renal function, hyponatremia, hypokalemia and elevated liver enzymes with her last ER visit. Will plan to follow up with results for any further plan/intervention. She also has follow up scheduled with neurology on 4/16/2024.   Orders:    Referral to Primary Care - Family Practice    Chest pain, unspecified type  Acute problem resultsing in ER visit, resolved today.         Hospital discharge follow-up  Hospital notes, imaging and labs reviewed for pertinence to appointment.        Hypokalemia  Repeat labs as discussed  Orders:    Comprehensive metabolic panel;  Future    Hyponatremia  Repeat labs as discussed  Orders:    Comprehensive metabolic panel; Future    Elevated liver enzymes  Repeat labs as discussed  Orders:    Comprehensive metabolic panel; Future

## 2024-12-18 NOTE — ASSESSMENT & PLAN NOTE
Acute problem which is now resolved.   Neuro exam unremarkable at this visit.  Patient describes no further episodes or instances of altered mental status since discharge on 12/2/2024. Will plan to repeat labs as they were found to be abnormal at that visit, including decreased renal function, hyponatremia, hypokalemia and elevated liver enzymes with her last ER visit. Will plan to follow up with results for any further plan/intervention. She also has follow up scheduled with neurology on 4/16/2024.   Orders:    Referral to Primary Care - Family Practice

## 2024-12-19 ENCOUNTER — LAB (OUTPATIENT)
Dept: LAB | Facility: LAB | Age: 64
End: 2024-12-19
Payer: COMMERCIAL

## 2024-12-19 DIAGNOSIS — E87.1 HYPONATREMIA: ICD-10-CM

## 2024-12-19 DIAGNOSIS — E87.6 HYPOKALEMIA: ICD-10-CM

## 2024-12-19 DIAGNOSIS — R74.8 ELEVATED LIVER ENZYMES: ICD-10-CM

## 2024-12-19 LAB
ALBUMIN SERPL BCP-MCNC: 4.3 G/DL (ref 3.4–5)
ALP SERPL-CCNC: 65 U/L (ref 33–136)
ALT SERPL W P-5'-P-CCNC: 63 U/L (ref 7–45)
ANION GAP SERPL CALC-SCNC: 11 MMOL/L (ref 10–20)
AST SERPL W P-5'-P-CCNC: 36 U/L (ref 9–39)
BILIRUB SERPL-MCNC: 0.5 MG/DL (ref 0–1.2)
BUN SERPL-MCNC: 24 MG/DL (ref 6–23)
CALCIUM SERPL-MCNC: 9.5 MG/DL (ref 8.6–10.3)
CHLORIDE SERPL-SCNC: 105 MMOL/L (ref 98–107)
CO2 SERPL-SCNC: 29 MMOL/L (ref 21–32)
CREAT SERPL-MCNC: 0.88 MG/DL (ref 0.5–1.05)
EGFRCR SERPLBLD CKD-EPI 2021: 74 ML/MIN/1.73M*2
GLUCOSE SERPL-MCNC: 91 MG/DL (ref 74–99)
POTASSIUM SERPL-SCNC: 3.9 MMOL/L (ref 3.5–5.3)
PROT SERPL-MCNC: 7.5 G/DL (ref 6.4–8.2)
SODIUM SERPL-SCNC: 141 MMOL/L (ref 136–145)

## 2024-12-19 PROCEDURE — 36415 COLL VENOUS BLD VENIPUNCTURE: CPT

## 2024-12-22 LAB
AORTIC VALVE MEAN GRADIENT: 11 MMHG
AORTIC VALVE PEAK VELOCITY: 2.42 M/S
AV PEAK GRADIENT: 23 MMHG
AVA (PEAK VEL): 1.71 CM2
AVA (VTI): 1.89 CM2
EJECTION FRACTION APICAL 4 CHAMBER: 74.4
EJECTION FRACTION: 63 %
LEFT ATRIUM VOLUME AREA LENGTH INDEX BSA: 35.8 ML/M2
LEFT VENTRICLE INTERNAL DIMENSION DIASTOLE: 4.06 CM (ref 3.5–6)
LEFT VENTRICULAR OUTFLOW TRACT DIAMETER: 1.89 CM
MITRAL VALVE E/A RATIO: 0.86
RIGHT VENTRICLE FREE WALL PEAK S': 14 CM/S
RIGHT VENTRICLE PEAK SYSTOLIC PRESSURE: 26.2 MMHG
TRICUSPID ANNULAR PLANE SYSTOLIC EXCURSION: 2.8 CM

## 2025-01-13 ENCOUNTER — TELEPHONE (OUTPATIENT)
Dept: PRIMARY CARE | Facility: CLINIC | Age: 65
End: 2025-01-13
Payer: COMMERCIAL

## 2025-01-13 DIAGNOSIS — E78.00 HYPERCHOLESTEROLEMIA: ICD-10-CM

## 2025-01-13 RX ORDER — ROSUVASTATIN CALCIUM 40 MG/1
40 TABLET, COATED ORAL DAILY
Qty: 90 TABLET | Refills: 0 | Status: SHIPPED | OUTPATIENT
Start: 2025-01-13

## 2025-01-13 NOTE — TELEPHONE ENCOUNTER
Patient requesting a refill on rosuvastatin 14 mg going to [facility].    Last Appt: 12/18/2024    Contact: [phone]

## 2025-01-17 LAB — BODY SURFACE AREA: 1.67 M2

## 2025-01-21 ENCOUNTER — APPOINTMENT (OUTPATIENT)
Dept: CARDIOLOGY | Facility: CLINIC | Age: 65
End: 2025-01-21
Payer: COMMERCIAL

## 2025-01-21 VITALS
DIASTOLIC BLOOD PRESSURE: 85 MMHG | HEART RATE: 71 BPM | SYSTOLIC BLOOD PRESSURE: 138 MMHG | WEIGHT: 144 LBS | HEIGHT: 62 IN | BODY MASS INDEX: 26.5 KG/M2 | OXYGEN SATURATION: 100 %

## 2025-01-21 DIAGNOSIS — I10 PRIMARY HYPERTENSION: ICD-10-CM

## 2025-01-21 DIAGNOSIS — E78.00 HYPERCHOLESTEROLEMIA: ICD-10-CM

## 2025-01-21 DIAGNOSIS — I25.10 ARTERIOSCLEROSIS OF CORONARY ARTERY: Primary | ICD-10-CM

## 2025-01-21 DIAGNOSIS — R40.4 TRANSIENT ALTERATION OF AWARENESS: ICD-10-CM

## 2025-01-21 PROCEDURE — 99214 OFFICE O/P EST MOD 30 MIN: CPT | Performed by: NURSE PRACTITIONER

## 2025-01-21 PROCEDURE — 3079F DIAST BP 80-89 MM HG: CPT | Performed by: NURSE PRACTITIONER

## 2025-01-21 PROCEDURE — 3075F SYST BP GE 130 - 139MM HG: CPT | Performed by: NURSE PRACTITIONER

## 2025-01-21 PROCEDURE — 3008F BODY MASS INDEX DOCD: CPT | Performed by: NURSE PRACTITIONER

## 2025-01-21 PROCEDURE — 1036F TOBACCO NON-USER: CPT | Performed by: NURSE PRACTITIONER

## 2025-01-21 RX ORDER — ASPIRIN 81 MG/1
81 TABLET ORAL DAILY
COMMUNITY

## 2025-01-21 NOTE — PROGRESS NOTES
Primary Care Physician: Charlene Matson MD      Date of Visit: 01/21/2025 10:00 AM EST  Location of visit:  [facility]   Type of Visit: New Patient        Chief Complaint   Patient presents with    Hospital Follow-up     Hospital follow up for passing out and had altered mental status. Would like to know results from testing from hospital stay         HPI / Summary:   Shante Nelson is a 64 y.o. female who presents to establish cardiac care   Past medical history significant for HTN, HLD and asymptomatic ASCVD         Was seen in the ER and hospitalized is having persistent memory loss,  has episodes noted by co-workers where she was awake, but not acting normal but can't remember events. She was at work    She becomes responsive to physical stimulation, awakes confused   she's thinks she might be sleepwalking; she is typically active but symptoms are limiting her functional capacity.  No personal cardiac history     She has intermittent palpitations   Stress test normal in 2022   An extended Holter at follow up was essentially normal without you AF or sustained PVC   Follows with Dr. Buenrostro for HTN,  who recently retired             12 system review is negative except as noted above       Medical History:   Past Medical History:   Diagnosis Date    Abnormal thyroid function test 01/11/2022    Arteriosclerosis of coronary artery 09/14/2022    Hypercholesterolemia 02/15/2019    Hyperproteinemia 05/13/2020    Hypertension 02/15/2019       Surgical History:   Past Surgical History:   Procedure Laterality Date    CATARACT EXTRACTION Bilateral        Family History:   Family History   Problem Relation Name Age of Onset    Heart disease Mother      Heart disease Father      Breast cancer Sister         Social History:   Tobacco Use: Low Risk  (1/21/2025)    Patient History     Smoking Tobacco Use: Never     Smokeless Tobacco Use: Never     Passive Exposure: Never             MEDICATIONS:   Current Outpatient  Medications   Medication Instructions    aspirin 81 mg, Daily    chlorthalidone (HYGROTON) 12.5 mg, oral, Daily    losartan (COZAAR) 100 mg, oral, Daily    metoprolol succinate XL (TOPROL-XL) 50 mg, oral, Daily    rosuvastatin (CRESTOR) 40 mg, oral, Daily         IMAGING REPORTS INDEPENDENTLY REVIEWED:     Echocardiogram 12/18/2024  CONCLUSIONS:   1. The left ventricular systolic function is normal, with a visually estimated ejection fraction of 60-65%.   2. Spectral Doppler shows an abnormal pattern of left ventricular diastolic filling.   3. There is normal right ventricular global systolic function.   4. Right ventricular systolic pressure is within normal limits.   5. Aortic valve sclerosis.   6. There is no evidence of a patent foramen ovale.   7. The peak instantaneous gradient of the aortic valve is 23 mmHg.     Cardiac stress testing 8/04/2022 --- outside records     LABS:  CBC:   Lab Results   Component Value Date    WBC 10.2 12/04/2024    RBC 4.15 12/04/2024    HGB 12.7 12/04/2024    HCT 38.1 12/04/2024    MCV 92 12/04/2024    MCH 30.6 12/04/2024    MCHC 33.3 12/04/2024    RDW 12.4 12/04/2024     12/04/2024    MPV 13.2 (H) 09/08/2023     CBC with Differential:    Lab Results   Component Value Date    WBC 10.2 12/04/2024    RBC 4.15 12/04/2024    HGB 12.7 12/04/2024    HCT 38.1 12/04/2024     12/04/2024    MCV 92 12/04/2024    MCH 30.6 12/04/2024    MCHC 33.3 12/04/2024    RDW 12.4 12/04/2024    NRBC 0.0 12/04/2024    LYMPHOPCT 27.7 12/04/2024    MONOPCT 10.1 12/04/2024    EOSPCT 1.8 12/04/2024    BASOPCT 0.7 12/04/2024    MONOSABS 1.03 (H) 12/04/2024    LYMPHSABS 2.82 12/04/2024    EOSABS 0.18 12/04/2024    BASOSABS 0.07 12/04/2024     CMP:    Lab Results   Component Value Date     12/19/2024    K 3.9 12/19/2024     12/19/2024    CO2 29 12/19/2024    BUN 24 (H) 12/19/2024    CREATININE 0.88 12/19/2024    GLUCOSE 91 12/19/2024    PROT 7.5 12/19/2024    CALCIUM 9.5 12/19/2024     "BILITOT 0.5 12/19/2024    ALKPHOS 65 12/19/2024    AST 36 12/19/2024    ALT 63 (H) 12/19/2024     BMP:    Lab Results   Component Value Date     12/19/2024    K 3.9 12/19/2024     12/19/2024    CO2 29 12/19/2024    BUN 24 (H) 12/19/2024    CREATININE 0.88 12/19/2024    CALCIUM 9.5 12/19/2024    GLUCOSE 91 12/19/2024     Magnesium:  Lab Results   Component Value Date    MG 2.09 12/04/2024     Troponin:    Lab Results   Component Value Date    TROPHS 12 12/04/2024    TROPHS 17 (H) 12/03/2024    TROPHS 19 (H) 12/03/2024     BNP: No results found for: \"BNP\"    Lipid Panel:  Lab Results   Component Value Date    HDL 48.5 10/18/2024    CHHDL 3.6 10/18/2024    VLDL 43 (H) 10/18/2024    TRIG 213 (H) 10/18/2024    NHDL 129 10/18/2024        Lab work and imaging results independently reviewed by me         Visit Vitals  /85   Pulse 71   Ht 1.575 m (5' 2\")   Wt 65.3 kg (144 lb)   SpO2 100%   BMI 26.34 kg/m²   OB Status Postmenopausal   Smoking Status Never   BSA 1.69 m²          ECG dated 12/04/2024   independently reviewed   NSR 65        Constitutional:       Appearance: Healthy appearance. Not in distress.   Eyes:      Conjunctiva/sclera: Conjunctivae normal.   Neck:      Vascular: JVD normal.   Pulmonary:      Effort: Pulmonary effort is normal.      Breath sounds: Normal breath sounds.   Cardiovascular:      PMI at left midclavicular line. Normal rate. Regular rhythm. Normal S1. Normal S2.       Murmurs: There is no murmur.      No rub.   Pulses:     Intact distal pulses.   Edema:     Peripheral edema absent.   Abdominal:      General: Bowel sounds are normal.   Musculoskeletal:      Cervical back: Neck supple. Skin:     General: Skin is warm and dry.   Neurological:      Mental Status: Alert and oriented to person, place and time.           Problem List Items Addressed This Visit             ICD-10-CM    Arteriosclerosis of coronary artery - Primary I25.10    Hypercholesterolemia E78.00    Hypertension " I10    Altered mental status, unspecified R41.82     Holter negative for arrhythmia   Echo showed preserved LV systolic function without PFO   Treadmill stress negative in 2022  BP in acceptable range on current Rx,  which she can follow up with PCP   No further cardiac testing at this time     She has neurology follow up scheduled         01/21/25 at 10:37 AM - HITESH Delacruz-CNP        Followup Appts:  Future Appointments   Date Time Provider Department Center   1/23/2025  9:30 AM Ray Leroy MD [id number] [facility]   1/30/2025  9:30 AM GEN PAT GENPAT [facility]   2/14/2025  7:30 AM Mohinder Mason DO [email or web address] [facility]   4/16/2025  2:00 PM Vinh Campbell MD [id number] [facility]   5/21/2025  8:00 AM Iron Lugo MD [id number] [facility]

## 2025-01-23 ENCOUNTER — APPOINTMENT (OUTPATIENT)
Dept: OPHTHALMOLOGY | Facility: CLINIC | Age: 65
End: 2025-01-23
Payer: COMMERCIAL

## 2025-01-23 DIAGNOSIS — H04.123 DRY EYES, BILATERAL: ICD-10-CM

## 2025-01-23 DIAGNOSIS — Z96.1 PSEUDOPHAKIA OF BOTH EYES: Primary | ICD-10-CM

## 2025-01-23 PROCEDURE — 99213 OFFICE O/P EST LOW 20 MIN: CPT | Performed by: OPHTHALMOLOGY

## 2025-01-23 ASSESSMENT — VISUAL ACUITY
OD_SC: 20/20
METHOD: SNELLEN - SINGLE
OS_SC: 20/20

## 2025-01-23 ASSESSMENT — ENCOUNTER SYMPTOMS
RESPIRATORY NEGATIVE: 0
MUSCULOSKELETAL NEGATIVE: 0
GASTROINTESTINAL NEGATIVE: 0
CONSTITUTIONAL NEGATIVE: 0
PSYCHIATRIC NEGATIVE: 0
ENDOCRINE NEGATIVE: 0
CARDIOVASCULAR NEGATIVE: 0
NEUROLOGICAL NEGATIVE: 0
HEMATOLOGIC/LYMPHATIC NEGATIVE: 0
ALLERGIC/IMMUNOLOGIC NEGATIVE: 0
EYES NEGATIVE: 0

## 2025-01-23 ASSESSMENT — CUP TO DISC RATIO
OS_RATIO: 0.4
OD_RATIO: 0.5

## 2025-01-23 ASSESSMENT — TONOMETRY
IOP_METHOD: GOLDMANN APPLANATION
OD_IOP_MMHG: 16
OS_IOP_MMHG: 16

## 2025-01-23 ASSESSMENT — REFRACTION_MANIFEST
OD_CYLINDER: -0.50
OS_SPHERE: -0.25
METHOD_AUTOREFRACTION: 1
OD_SPHERE: -0.50
OD_AXIS: 070
OS_AXIS: 040
OS_CYLINDER: -0.75

## 2025-01-23 ASSESSMENT — KERATOMETRY
OS_AXISANGLE_DEGREES: 120
METHOD_AUTO_MANUAL: AUTOMATED
OD_K2POWER_DIOPTERS: 43.50
OD_K1POWER_DIOPTERS: 43.25
OS_AXISANGLE2_DEGREES: 30
OD_AXISANGLE2_DEGREES: 35
OD_AXISANGLE_DEGREES: 125
OS_K2POWER_DIOPTERS: 44.25
OS_K1POWER_DIOPTERS: 43.50

## 2025-01-23 ASSESSMENT — PATIENT HEALTH QUESTIONNAIRE - PHQ9
2. FEELING DOWN, DEPRESSED OR HOPELESS: NOT AT ALL
1. LITTLE INTEREST OR PLEASURE IN DOING THINGS: NOT AT ALL
SUM OF ALL RESPONSES TO PHQ9 QUESTIONS 1 AND 2: 0

## 2025-01-23 ASSESSMENT — PAIN SCALES - GENERAL: PAINLEVEL_OUTOF10: 0-NO PAIN

## 2025-01-23 ASSESSMENT — SLIT LAMP EXAM - LIDS
COMMENTS: NORMAL
COMMENTS: NORMAL

## 2025-01-23 ASSESSMENT — EXTERNAL EXAM - RIGHT EYE: OD_EXAM: NORMAL

## 2025-01-23 ASSESSMENT — EXTERNAL EXAM - LEFT EYE: OS_EXAM: NORMAL

## 2025-01-23 NOTE — PATIENT INSTRUCTIONS
Thank you so much for choosing me to provide your care today!    If you were dilated your vision may remain blurry   or light sensitive for several hours.    The nature of eye and vision problems can require frequent follow up, please make every effort to adhere to any future appointments.    If you have any issues, questions, or concerns,   please do not hesitate to reach out.    If you receive a survey in regards to your care today, please mention any exceptional care my office staff and/or technicians provided.    You can reach our office at this number:    [phone]    Please consider signing up for and utilizing [employer]!  This is the best way to directly reach me or other  providers    Artificial Tears/lubricating drops  Recommendations for daily use      Refresh  [x]Refresh Tears []Refresh Advance  []Refresh Optive  []Refresh LiquiGel  []Refresh preservative free in single use vial    Systane  [x]Systane ultra []Systane Balance  []Systane Gel   []Systane preservative free in single use vial    Genteal  []Genteal  []Genteal gel    Blink  []Blink   []Blink for contacts      You may use these drops:  [x]1 drop 2-4 times daily  []1 drop 4 times daily  []1 drop every 1-2 hours or as needed  []At bedtime    It is OK to substitute generic store brand varieties of drops. Artificial tears work best when used consistently instead of as needed.    *Avoid any drops for allergy/itching/redness unless directed otherwise*

## 2025-01-23 NOTE — PROGRESS NOTES
Assessment/Plan   Problem List Items Addressed This Visit       Pseudophakia of both eyes - Primary     Stable appearing intraocular lens (IOL) both eyes (OU) with well treated PCO after YAG. Will continue to monitor with serial exam.          Dry eyes, bilateral     Advised on regular lubrication for best ocular comfort and vision.             Provided reassurance regarding above diagnoses and care received in the office visit today. Discussed outcomes and options along with the importance of treatment compliance. Understands the importance of any follow up visits. Patient instructed to call/communicate with our office if any new issues, questions, or concerns.     Will plan to see back in 1 year full or sooner PRN

## 2025-01-24 ENCOUNTER — TELEPHONE (OUTPATIENT)
Dept: PRIMARY CARE | Facility: CLINIC | Age: 65
End: 2025-01-24
Payer: COMMERCIAL

## 2025-01-24 DIAGNOSIS — E78.00 HYPERCHOLESTEROLEMIA: ICD-10-CM

## 2025-01-24 RX ORDER — ROSUVASTATIN CALCIUM 40 MG/1
40 TABLET, COATED ORAL DAILY
Qty: 30 TABLET | Refills: 0 | Status: SHIPPED | OUTPATIENT
Start: 2025-01-24 | End: 2025-02-23

## 2025-01-24 NOTE — TELEPHONE ENCOUNTER
Refill for    Rosuvastatin 40 MG (Patient stated she still hasn't received refill from [facility] and would like to have 30 days sent to local Pharmacy)    Pharmacy [facility] in [place]    Patient can be reached at [phone]

## 2025-01-24 NOTE — TELEPHONE ENCOUNTER
Patient is calling in for a refill on Rosuvastatin 40 mg tabs.  Patient is waiting on her mail order from [facility] and is almost out of medication, please send a 30 day to [facility] [address] in [place].  Patient last seen Annual exam 10/23/24.

## 2025-01-30 ENCOUNTER — ANESTHESIA EVENT (OUTPATIENT)
Dept: GASTROENTEROLOGY | Facility: HOSPITAL | Age: 65
End: 2025-01-30
Payer: COMMERCIAL

## 2025-01-30 ENCOUNTER — PRE-ADMISSION TESTING (OUTPATIENT)
Dept: PREADMISSION TESTING | Facility: HOSPITAL | Age: 65
End: 2025-01-30
Payer: COMMERCIAL

## 2025-01-30 RX ORDER — CHOLECALCIFEROL (VITAMIN D3) 25 MCG
1000 TABLET ORAL DAILY
COMMUNITY

## 2025-01-30 ASSESSMENT — DUKE ACTIVITY SCORE INDEX (DASI)
CAN YOU DO YARD WORK LIKE RAKING LEAVES, WEEDING OR PUSHING A MOWER: YES
CAN YOU DO HEAVY WORK AROUND THE HOUSE LIKE SCRUBBING FLOORS OR LIFTING AND MOVING HEAVY FURNITURE: YES
CAN YOU PARTICIPATE IN STRENOUS SPORTS LIKE SWIMMING, SINGLES TENNIS, FOOTBALL, BASKETBALL, OR SKIING: YES
CAN YOU HAVE SEXUAL RELATIONS: YES
CAN YOU WALK INDOORS, SUCH AS AROUND YOUR HOUSE: YES
CAN YOU WALK A BLOCK OR TWO ON LEVEL GROUND: YES
CAN YOU TAKE CARE OF YOURSELF (EAT, DRESS, BATHE, OR USE TOILET): YES
CAN YOU DO MODERATE WORK AROUND THE HOUSE LIKE VACUUMING, SWEEPING FLOORS OR CARRYING GROCERIES: YES
CAN YOU PARTICIPATE IN MODERATE RECREATIONAL ACTIVITIES LIKE GOLF, BOWLING, DANCING, DOUBLES TENNIS OR THROWING A BASEBALL OR FOOTBALL: YES
CAN YOU CLIMB A FLIGHT OF STAIRS OR WALK UP A HILL: YES

## 2025-01-30 NOTE — ANESTHESIA PREPROCEDURE EVALUATION
Patient: Shante Nelson    Procedure Information       Date/Time: 02/14/25 0730    Scheduled providers: Mohinder Mason DO    Procedure: COLONOSCOPY    Location: [facility]          Vitals:    02/14/25 0648   BP: 128/80   Pulse: 63   Resp: 18   Temp: 36.4 °C (97.5 °F)   SpO2: 100%       Past Surgical History:   Procedure Laterality Date   • CATARACT EXTRACTION Bilateral      Past Medical History:   Diagnosis Date   • Abnormal thyroid function test 01/11/2022   • Arteriosclerosis of coronary artery 09/14/2022   • Hypercholesterolemia 02/15/2019   • Hyperproteinemia 05/13/2020   • Hypertension 02/15/2019       Current Outpatient Medications:   •  aspirin 81 mg EC tablet, Take 1 tablet (81 mg) by mouth once daily., Disp: , Rfl:   •  chlorthalidone (Hygroton) 25 mg tablet, Take 0.5 tablets (12.5 mg) by mouth once daily., Disp: 45 tablet, Rfl: 3  •  cholecalciferol (Vitamin D3) 25 MCG (1000 UT) tablet, Take 1 tablet (1,000 Units) by mouth once daily., Disp: , Rfl:   •  losartan (Cozaar) 100 mg tablet, Take 1 tablet (100 mg) by mouth once daily., Disp: 90 tablet, Rfl: 0  •  metoprolol succinate XL (Toprol-XL) 50 mg 24 hr tablet, Take 1 tablet (50 mg) by mouth once daily., Disp: 90 tablet, Rfl: 3  •  rosuvastatin (Crestor) 40 mg tablet, Take 1 tablet (40 mg) by mouth once daily., Disp: 90 tablet, Rfl: 0  Prior to Admission medications    Medication Sig Start Date End Date Taking? Authorizing Provider   aspirin 81 mg EC tablet Take 1 tablet (81 mg) by mouth once daily.   Yes Historical Provider, MD   chlorthalidone (Hygroton) 25 mg tablet Take 0.5 tablets (12.5 mg) by mouth once daily. 2/7/25 2/2/26 Yes Charlene Matson MD   cholecalciferol (Vitamin D3) 25 MCG (1000 UT) tablet Take 1 tablet (1,000 Units) by mouth once daily.   Yes Historical Provider, MD   losartan (Cozaar) 100 mg tablet Take 1 tablet (100 mg) by mouth once daily. 12/6/24 3/6/25 Yes Charlene Matson MD   metoprolol succinate XL (Toprol-XL) 50  "mg 24 hr tablet Take 1 tablet (50 mg) by mouth once daily. 11/4/24  Yes Charlene Matson MD   rosuvastatin (Crestor) 40 mg tablet Take 1 tablet (40 mg) by mouth once daily. 1/13/25  Yes Charlene Matson MD     Allergies   Allergen Reactions   • Latex Rash     Social History     Tobacco Use   • Smoking status: Never     Passive exposure: Never   • Smokeless tobacco: Never   Substance Use Topics   • Alcohol use: Never         Chemistry    Lab Results   Component Value Date/Time     12/19/2024 0809    K 3.9 12/19/2024 0809     12/19/2024 0809    CO2 29 12/19/2024 0809    BUN 24 (H) 12/19/2024 0809    CREATININE 0.88 12/19/2024 0809    Lab Results   Component Value Date/Time    CALCIUM 9.5 12/19/2024 0809    ALKPHOS 65 12/19/2024 0809    AST 36 12/19/2024 0809    ALT 63 (H) 12/19/2024 0809    BILITOT 0.5 12/19/2024 0809          Lab Results   Component Value Date/Time    WBC 10.2 12/04/2024 2243    HGB 12.7 12/04/2024 2243    HCT 38.1 12/04/2024 2243     12/04/2024 2243     No results found for: \"PROTIME\", \"PTT\", \"INR\"  Encounter Date: 12/04/24   ECG 12 lead   Result Value    Ventricular Rate 65    Atrial Rate 65    IN Interval 130    QRS Duration 78    QT Interval 424    QTC Calculation(Bazett) 440    P Axis 31    R Axis 57    T Axis 46    QRS Count 11    Q Onset 219    P Onset 154    P Offset 203    T Offset 431    QTC Fredericia 435    Narrative    Normal sinus rhythm  Normal ECG  When compared with ECG of 04-DEC-2024 21:34, (unconfirmed)  No significant change was found  See ED provider note for full interpretation and clinical correlation  Confirmed by Laureen Pritchard ([id number]) on 12/14/2024 8:41:43 PM     No results found for this or any previous visit from the past 1095 days.      Relevant Problems   Anesthesia (within normal limits)      Cardiac   (+) Arteriosclerosis of coronary artery   (+) Hypercholesterolemia   (+) Hypertension      Pulmonary (within normal limits)      Neuro (within normal " limits)      GI (within normal limits)      /Renal (within normal limits)      Liver (within normal limits)      Endocrine (within normal limits)      Hematology (within normal limits)      Musculoskeletal (within normal limits)      HEENT (within normal limits)      ID (within normal limits)      Skin (within normal limits)      GYN (within normal limits)     There were no vitals filed for this visit.    Past Surgical History:   Procedure Laterality Date   • CATARACT EXTRACTION Bilateral      Past Medical History:   Diagnosis Date   • Abnormal thyroid function test 01/11/2022   • Arteriosclerosis of coronary artery 09/14/2022   • Hypercholesterolemia 02/15/2019   • Hyperproteinemia 05/13/2020   • Hypertension 02/15/2019       Current Outpatient Medications:   •  aspirin 81 mg EC tablet, Take 1 tablet (81 mg) by mouth once daily., Disp: , Rfl:   •  chlorthalidone (Hygroton) 25 mg tablet, Take 0.5 tablets (12.5 mg) by mouth once daily., Disp: 45 tablet, Rfl: 1  •  losartan (Cozaar) 100 mg tablet, Take 1 tablet (100 mg) by mouth once daily., Disp: 90 tablet, Rfl: 0  •  metoprolol succinate XL (Toprol-XL) 50 mg 24 hr tablet, Take 1 tablet (50 mg) by mouth once daily., Disp: 90 tablet, Rfl: 3  •  rosuvastatin (Crestor) 40 mg tablet, Take 1 tablet (40 mg) by mouth once daily., Disp: 90 tablet, Rfl: 0  •  rosuvastatin (Crestor) 40 mg tablet, Take 1 tablet (40 mg) by mouth once daily., Disp: 30 tablet, Rfl: 0  Prior to Admission medications    Medication Sig Start Date End Date Taking? Authorizing Provider   aspirin 81 mg EC tablet Take 1 tablet (81 mg) by mouth once daily.    Historical Provider, MD   chlorthalidone (Hygroton) 25 mg tablet Take 0.5 tablets (12.5 mg) by mouth once daily. 11/13/24 5/12/25  Charlene Matson MD   losartan (Cozaar) 100 mg tablet Take 1 tablet (100 mg) by mouth once daily. 12/6/24 3/6/25  Charlene Matson MD   metoprolol succinate XL (Toprol-XL) 50 mg 24 hr tablet Take 1 tablet (50 mg) by  "mouth once daily. 11/4/24   Charlene Matson MD   rosuvastatin (Crestor) 40 mg tablet Take 1 tablet (40 mg) by mouth once daily. 1/13/25   Charlene Matson MD   rosuvastatin (Crestor) 40 mg tablet Take 1 tablet (40 mg) by mouth once daily. 1/24/25 2/23/25  Charlene Matson MD     Allergies   Allergen Reactions   • Latex Rash     Social History     Tobacco Use   • Smoking status: Never     Passive exposure: Never   • Smokeless tobacco: Never   Substance Use Topics   • Alcohol use: Never         Chemistry    Lab Results   Component Value Date/Time     12/19/2024 0809    K 3.9 12/19/2024 0809     12/19/2024 0809    CO2 29 12/19/2024 0809    BUN 24 (H) 12/19/2024 0809    CREATININE 0.88 12/19/2024 0809    Lab Results   Component Value Date/Time    CALCIUM 9.5 12/19/2024 0809    ALKPHOS 65 12/19/2024 0809    AST 36 12/19/2024 0809    ALT 63 (H) 12/19/2024 0809    BILITOT 0.5 12/19/2024 0809          Lab Results   Component Value Date/Time    WBC 10.2 12/04/2024 2243    HGB 12.7 12/04/2024 2243    HCT 38.1 12/04/2024 2243     12/04/2024 2243     No results found for: \"PROTIME\", \"PTT\", \"INR\"  Encounter Date: 12/04/24   ECG 12 lead   Result Value    Ventricular Rate 65    Atrial Rate 65    WA Interval 130    QRS Duration 78    QT Interval 424    QTC Calculation(Bazett) 440    P Axis 31    R Axis 57    T Axis 46    QRS Count 11    Q Onset 219    P Onset 154    P Offset 203    T Offset 431    QTC Fredericia 435    Narrative    Normal sinus rhythm  Normal ECG  When compared with ECG of 04-DEC-2024 21:34, (unconfirmed)  No significant change was found  See ED provider note for full interpretation and clinical correlation  Confirmed by Laureen Pritchard ([id number]) on 12/14/2024 8:41:43 PM     No results found for this or any previous visit from the past 1095 days.    Clinical information reviewed:                 Chart reviewed.  Recent negative cardiac workup for syncope.  Following up with neurology.  No " clearances ordered.    Echo 12/2/24-  CONCLUSIONS:   1. The left ventricular systolic function is normal, with a visually estimated ejection fraction of 60-65%.   2. Spectral Doppler shows an abnormal pattern of left ventricular diastolic filling.   3. There is normal right ventricular global systolic function.   4. Right ventricular systolic pressure is within normal limits.   5. Aortic valve sclerosis.   6. There is no evidence of a patent foramen ovale.   7. The peak instantaneous gradient of the aortic valve is 23 mmHg.    7/15/22 CT cardiac scoring-  IMPRESSION:  1. Coronary artery calcium score of  60*.      NPO Detail:  No data recorded     Physical Exam    Airway  Mallampati: II  TM distance: >3 FB     Cardiovascular - normal exam     Dental   (+) implants       Pulmonary - normal exam     Abdominal - normal exam         Anesthesia Plan    History of general anesthesia?: yes  History of complications of general anesthesia?: no    ASA 2     MAC     The patient is not a current smoker.  Patient was previously instructed to abstain from smoking on day of procedure.  Patient did not smoke on day of procedure.    intravenous induction   Anesthetic plan and risks discussed with patient.  Use of blood products discussed with patient who consented to blood products.    Plan discussed with CRNA.

## 2025-01-30 NOTE — PREPROCEDURE INSTRUCTIONS
Medication List            Accurate as of January 30, 2025  8:37 AM. Always use your most recent med list.                aspirin 81 mg EC tablet  Medication Adjustments for Surgery: Do Not take on the morning of surgery     chlorthalidone 25 mg tablet  Commonly known as: Hygroton  Take 0.5 tablets (12.5 mg) by mouth once daily.  Medication Adjustments for Surgery: Take on the morning of surgery     losartan 100 mg tablet  Commonly known as: Cozaar  Take 1 tablet (100 mg) by mouth once daily.  Medication Adjustments for Surgery: Take on the morning of surgery     metoprolol succinate XL 50 mg 24 hr tablet  Commonly known as: Toprol-XL  Take 1 tablet (50 mg) by mouth once daily.  Medication Adjustments for Surgery: Take on the morning of surgery     * rosuvastatin 40 mg tablet  Commonly known as: Crestor  Take 1 tablet (40 mg) by mouth once daily.  Medication Adjustments for Surgery: Take last dose 1 day (24 hours) before surgery     * rosuvastatin 40 mg tablet  Commonly known as: Crestor  Take 1 tablet (40 mg) by mouth once daily.           * This list has 2 medication(s) that are the same as other medications prescribed for you. Read the directions carefully, and ask your doctor or other care provider to review them with you.                    NPO Instructions:  NO SOLID FOOD OR DAIRY PRODUCTS the day prior to your procedure-ONLY CLEAR LIQUIDS     No red or purple     You may have clear liquids, gum and hard candy up to THREE HOURS prior to your procedure-Do not have anything after this time     Only drink the following clear liquids after midnight the night before your procedure: water, black coffee or coffee with sugar, tea, Gatorade or Clear Soda-Ex. Gingerale or Sprite     Follow PREP instructions.     Take 1st part of prep- Evening Before Procedure. Drink lots of liquids.     Day of Procedure- 3-4am Take 2nd Part of Prep.     Take medications as instructed     Additional Instructions:     We will call you  the business day before your procedure between 1-3p to confirm your procedure and arrival time     Please park in the back of the hospital, in the ED parking and proceed to the second floor. This is through the ED waiting room and take elevator to the second floor. When off the elevator you will check in the OUTPATIENT department on the left.     Please keep in mind the [place] on [address]     Please make arrangements to have a responsible adult take you home and stay with you. NO DRIVING FOR 24 HOURS.     Please bring your ID and insurance card to your procedure     If you get ill at all before your procedure- CALL YOUR DOCTOR/SURGEON. We want you in the best shape that is possible. Any sickness might lead to your procedure being delayed.     When checked in to the outpatient unit, you will be asked to change into a hospital gown and remove all clothing, including underwear     An IV will be inserted      Your family or  will be asked to wait in the waiting room or cafeteria and will be notified when able to come sit with you     Typical recovery time will be aprox 30 minutes in PACU. Please let us know if you are having pain or nausea so we can assist you.     Please call [phone] with any further questions

## 2025-02-07 ENCOUNTER — TELEPHONE (OUTPATIENT)
Dept: PRIMARY CARE | Facility: CLINIC | Age: 65
End: 2025-02-07
Payer: COMMERCIAL

## 2025-02-07 DIAGNOSIS — I10 PRIMARY HYPERTENSION: ICD-10-CM

## 2025-02-07 RX ORDER — CHLORTHALIDONE 25 MG/1
12.5 TABLET ORAL DAILY
Qty: 45 TABLET | Refills: 3 | Status: SHIPPED | OUTPATIENT
Start: 2025-02-07 | End: 2026-02-02

## 2025-02-07 NOTE — TELEPHONE ENCOUNTER
Patient called Rx line and requested a refill for Chlorthalidone 25 mg. Last ov 12/18/2024.  Pharmacy: [facility]

## 2025-02-14 ENCOUNTER — HOSPITAL ENCOUNTER (OUTPATIENT)
Dept: GASTROENTEROLOGY | Facility: HOSPITAL | Age: 65
Discharge: HOME | End: 2025-02-14
Payer: COMMERCIAL

## 2025-02-14 ENCOUNTER — ANESTHESIA (OUTPATIENT)
Dept: GASTROENTEROLOGY | Facility: HOSPITAL | Age: 65
End: 2025-02-14
Payer: COMMERCIAL

## 2025-02-14 VITALS
OXYGEN SATURATION: 100 % | WEIGHT: 142 LBS | RESPIRATION RATE: 16 BRPM | BODY MASS INDEX: 26.13 KG/M2 | DIASTOLIC BLOOD PRESSURE: 69 MMHG | HEIGHT: 62 IN | HEART RATE: 60 BPM | TEMPERATURE: 96.8 F | SYSTOLIC BLOOD PRESSURE: 111 MMHG

## 2025-02-14 DIAGNOSIS — R19.5 POSITIVE COLORECTAL CANCER SCREENING USING COLOGUARD TEST: ICD-10-CM

## 2025-02-14 PROCEDURE — 7100000009 HC PHASE TWO TIME - INITIAL BASE CHARGE

## 2025-02-14 PROCEDURE — 3700000002 HC GENERAL ANESTHESIA TIME - EACH INCREMENTAL 1 MINUTE

## 2025-02-14 PROCEDURE — 45385 COLONOSCOPY W/LESION REMOVAL: CPT | Performed by: INTERNAL MEDICINE

## 2025-02-14 PROCEDURE — 3700000001 HC GENERAL ANESTHESIA TIME - INITIAL BASE CHARGE

## 2025-02-14 PROCEDURE — 2500000004 HC RX 250 GENERAL PHARMACY W/ HCPCS (ALT 636 FOR OP/ED)

## 2025-02-14 PROCEDURE — 7100000010 HC PHASE TWO TIME - EACH INCREMENTAL 1 MINUTE

## 2025-02-14 RX ORDER — PROPOFOL 10 MG/ML
INJECTION, EMULSION INTRAVENOUS AS NEEDED
Status: DISCONTINUED | OUTPATIENT
Start: 2025-02-14 | End: 2025-02-14

## 2025-02-14 RX ADMIN — PROPOFOL 20 MG: 10 INJECTION, EMULSION INTRAVENOUS at 07:44

## 2025-02-14 RX ADMIN — PROPOFOL 100 MG: 10 INJECTION, EMULSION INTRAVENOUS at 07:40

## 2025-02-14 RX ADMIN — PROPOFOL 20 MG: 10 INJECTION, EMULSION INTRAVENOUS at 07:42

## 2025-02-14 RX ADMIN — PROPOFOL 20 MG: 10 INJECTION, EMULSION INTRAVENOUS at 07:46

## 2025-02-14 RX ADMIN — PROPOFOL 20 MG: 10 INJECTION, EMULSION INTRAVENOUS at 07:51

## 2025-02-14 RX ADMIN — PROPOFOL 20 MG: 10 INJECTION, EMULSION INTRAVENOUS at 07:48

## 2025-02-14 SDOH — HEALTH STABILITY: MENTAL HEALTH: CURRENT SMOKER: 0

## 2025-02-14 ASSESSMENT — PAIN SCALES - GENERAL
PAINLEVEL_OUTOF10: 0 - NO PAIN
PAINLEVEL_OUTOF10: 4
PAIN_LEVEL: 0
PAINLEVEL_OUTOF10: 0 - NO PAIN

## 2025-02-14 ASSESSMENT — PAIN - FUNCTIONAL ASSESSMENT
PAIN_FUNCTIONAL_ASSESSMENT: 0-10

## 2025-02-14 ASSESSMENT — ENCOUNTER SYMPTOMS
BLOOD IN STOOL: 0
NEUROLOGICAL NEGATIVE: 1
MUSCULOSKELETAL NEGATIVE: 1
CARDIOVASCULAR NEGATIVE: 1
CHILLS: 0
PSYCHIATRIC NEGATIVE: 1
FEVER: 0
NAUSEA: 0
DIAPHORESIS: 0
FATIGUE: 0
WOUND: 0
ABDOMINAL PAIN: 0
DIARRHEA: 0
SHORTNESS OF BREATH: 0
CONSTIPATION: 0
VOMITING: 0

## 2025-02-14 ASSESSMENT — COLUMBIA-SUICIDE SEVERITY RATING SCALE - C-SSRS
1. IN THE PAST MONTH, HAVE YOU WISHED YOU WERE DEAD OR WISHED YOU COULD GO TO SLEEP AND NOT WAKE UP?: NO
6. HAVE YOU EVER DONE ANYTHING, STARTED TO DO ANYTHING, OR PREPARED TO DO ANYTHING TO END YOUR LIFE?: NO
2. HAVE YOU ACTUALLY HAD ANY THOUGHTS OF KILLING YOURSELF?: NO

## 2025-02-14 NOTE — ANESTHESIA POSTPROCEDURE EVALUATION
Patient: Shante Nelson    Procedure Summary       Date: 02/14/25 Room / Location: [facility]    Anesthesia Start: 0737 Anesthesia Stop: 0802    Procedure: COLONOSCOPY Diagnosis: Positive colorectal cancer screening using Cologuard test    Scheduled Providers: Mohinder Mason DO Responsible Provider: LEONARDO Castañeda    Anesthesia Type: MAC ASA Status: 2            Anesthesia Type: MAC    Vitals Value Taken Time   BP 87/56 02/14/25 0759   Temp 36 °C (96.8 °F) 02/14/25 0759   Pulse 67 02/14/25 0759   Resp 14 02/14/25 0759   SpO2 98 % 02/14/25 0759       Anesthesia Post Evaluation    Patient location during evaluation: PACU  Patient participation: complete - patient participated  Level of consciousness: awake and alert  Pain score: 0  Pain management: adequate  Airway patency: patent  Cardiovascular status: acceptable  Respiratory status: acceptable and face mask  Hydration status: acceptable  Postoperative Nausea and Vomiting: none    There were no known notable events for this encounter.

## 2025-02-14 NOTE — H&P
History Of Present Illness  Shante Nelson is a 64 y.o. female presenting with positive colorectal cancer screening using Cologuard test. Here for colonoscopy. First colonoscopy today. Positive Cologuard test on 10/28/2024. Referred by her PCP Charlene Matson MD. Reports no blood in stool. Regular bowel movements. Admits to mild abdominal cramping from the prep yesterday. Negative surgical hx. Denies family history of colon cancer, Crohn's, and UC. Tolerated prep well. Last dose of aspirin yesterday. Denies fever, chills, SoB, CP, constipation, n/v/d.      Past Medical History  Past Medical History:   Diagnosis Date    Abnormal thyroid function test 01/11/2022    Arteriosclerosis of coronary artery 09/14/2022    Hypercholesterolemia 02/15/2019    Hyperproteinemia 05/13/2020    Hypertension 02/15/2019       Surgical History  Past Surgical History:   Procedure Laterality Date    CATARACT EXTRACTION Bilateral         Social History  She reports that she has never smoked. She has never been exposed to tobacco smoke. She has never used smokeless tobacco. She reports that she does not drink alcohol and does not use drugs.    Family History  Family History   Problem Relation Name Age of Onset    Heart disease Mother      Heart disease Father      Breast cancer Sister          Allergies  Latex    Current Outpatient Medications on File Prior to Encounter   Medication Sig Dispense Refill    aspirin 81 mg EC tablet Take 1 tablet (81 mg) by mouth once daily.      chlorthalidone (Hygroton) 25 mg tablet Take 0.5 tablets (12.5 mg) by mouth once daily. 45 tablet 3    cholecalciferol (Vitamin D3) 25 MCG (1000 UT) tablet Take 1 tablet (1,000 Units) by mouth once daily.      losartan (Cozaar) 100 mg tablet Take 1 tablet (100 mg) by mouth once daily. 90 tablet 0    metoprolol succinate XL (Toprol-XL) 50 mg 24 hr tablet Take 1 tablet (50 mg) by mouth once daily. 90 tablet 3    rosuvastatin (Crestor) 40 mg tablet Take 1 tablet (40 mg) by  "mouth once daily. 90 tablet 0     No current facility-administered medications on file prior to encounter.      Review of Systems   Constitutional:  Negative for chills, diaphoresis, fatigue and fever.   HENT: Negative.     Respiratory:  Negative for shortness of breath.    Cardiovascular: Negative.  Negative for chest pain.   Gastrointestinal:  Negative for abdominal pain, blood in stool, constipation, diarrhea, nausea and vomiting.   Musculoskeletal: Negative.    Skin:  Negative for pallor, rash and wound.   Neurological: Negative.    Psychiatric/Behavioral: Negative.          Physical Exam  Constitutional:       General: She is not in acute distress.     Appearance: Normal appearance. She is not ill-appearing.   HENT:      Head: Normocephalic.   Eyes:      General: No scleral icterus.     Conjunctiva/sclera: Conjunctivae normal.      Pupils: Pupils are equal, round, and reactive to light.   Cardiovascular:      Rate and Rhythm: Normal rate and regular rhythm.      Pulses: Normal pulses.      Heart sounds: Normal heart sounds. No murmur heard.  Pulmonary:      Effort: Pulmonary effort is normal. No respiratory distress.      Breath sounds: Normal breath sounds.   Abdominal:      General: Abdomen is flat. Bowel sounds are normal. There is no distension.      Palpations: Abdomen is soft.      Tenderness: There is no abdominal tenderness.   Musculoskeletal:      Cervical back: Normal range of motion and neck supple.   Skin:     General: Skin is warm and dry.   Neurological:      General: No focal deficit present.      Mental Status: She is alert and oriented to person, place, and time.   Psychiatric:         Mood and Affect: Mood normal.         Behavior: Behavior normal.          Last Recorded Vitals  Blood pressure 128/80, pulse 63, temperature 36.4 °C (97.5 °F), temperature source Temporal, resp. rate 18, height 1.575 m (5' 2\"), weight 64.4 kg (142 lb), SpO2 100%.    Relevant Results  NONINV COLON CA DNA+OCC BLD " SCRN STL QL  Collected: 10/28/2024  Value:  Positive Abnormal        Assessment/Plan   Assessment & Plan  Positive colorectal cancer screening using Cologuard test      Colonoscopy.       I spent 20 minutes in the professional and overall care of this patient.      Lonnie Coley PA-C

## 2025-02-20 LAB
LABORATORY COMMENT REPORT: NORMAL
PATH REPORT.FINAL DX SPEC: NORMAL
PATH REPORT.GROSS SPEC: NORMAL
PATH REPORT.RELEVANT HX SPEC: NORMAL
PATH REPORT.TOTAL CANCER: NORMAL

## 2025-02-24 ENCOUNTER — TELEPHONE (OUTPATIENT)
Dept: PRIMARY CARE | Facility: CLINIC | Age: 65
End: 2025-02-24
Payer: COMMERCIAL

## 2025-02-24 DIAGNOSIS — E78.00 HYPERCHOLESTEROLEMIA: ICD-10-CM

## 2025-02-24 RX ORDER — ROSUVASTATIN CALCIUM 40 MG/1
40 TABLET, COATED ORAL DAILY
Qty: 90 TABLET | Refills: 1 | Status: SHIPPED | OUTPATIENT
Start: 2025-02-24

## 2025-02-24 NOTE — TELEPHONE ENCOUNTER
Patient called on RX line to request a refill of Rosuvastatin 40 mg be forwarded to [facility] in [place].  Please advise.    Patient ph# [phone]

## 2025-02-25 ENCOUNTER — TELEPHONE (OUTPATIENT)
Dept: PRIMARY CARE | Facility: CLINIC | Age: 65
End: 2025-02-25
Payer: COMMERCIAL

## 2025-02-25 DIAGNOSIS — I10 PRIMARY HYPERTENSION: ICD-10-CM

## 2025-02-25 DIAGNOSIS — I10 HYPERTENSION, UNSPECIFIED TYPE: ICD-10-CM

## 2025-02-25 RX ORDER — METOPROLOL SUCCINATE 50 MG/1
50 TABLET, EXTENDED RELEASE ORAL DAILY
Qty: 90 TABLET | Refills: 3 | Status: SHIPPED | OUTPATIENT
Start: 2025-02-25

## 2025-02-25 RX ORDER — LOSARTAN POTASSIUM 100 MG/1
100 TABLET ORAL DAILY
Qty: 90 TABLET | Refills: 0 | Status: SHIPPED | OUTPATIENT
Start: 2025-02-25 | End: 2025-05-26

## 2025-02-25 NOTE — TELEPHONE ENCOUNTER
Rx Refill Request Telephone Encounter    Name:  Shante Nelson  :  [id number]  Medication Name:  Metoprolol 50 mg. Losartan 100 mg, she is out of both            Specific Pharmacy location:  [facility]  Date of last appointment:  24  Date of next appointment:    Best number to reach patient:

## 2025-03-13 ENCOUNTER — APPOINTMENT (OUTPATIENT)
Dept: RADIOLOGY | Facility: HOSPITAL | Age: 65
End: 2025-03-13
Payer: COMMERCIAL

## 2025-04-16 ENCOUNTER — APPOINTMENT (OUTPATIENT)
Dept: NEUROLOGY | Facility: HOSPITAL | Age: 65
End: 2025-04-16
Payer: COMMERCIAL

## 2025-04-28 ENCOUNTER — APPOINTMENT (OUTPATIENT)
Dept: CARDIOLOGY | Facility: CLINIC | Age: 65
End: 2025-04-28
Payer: COMMERCIAL

## 2025-04-28 VITALS
DIASTOLIC BLOOD PRESSURE: 62 MMHG | BODY MASS INDEX: 25.97 KG/M2 | WEIGHT: 142 LBS | OXYGEN SATURATION: 97 % | SYSTOLIC BLOOD PRESSURE: 110 MMHG | RESPIRATION RATE: 16 BRPM | HEART RATE: 54 BPM

## 2025-04-28 DIAGNOSIS — I25.10 ARTERIOSCLEROSIS OF CORONARY ARTERY: Primary | ICD-10-CM

## 2025-04-28 DIAGNOSIS — E78.00 HYPERCHOLESTEROLEMIA: ICD-10-CM

## 2025-04-28 DIAGNOSIS — R55 SYNCOPE AND COLLAPSE: ICD-10-CM

## 2025-04-28 DIAGNOSIS — I10 PRIMARY HYPERTENSION: ICD-10-CM

## 2025-04-28 PROCEDURE — 99214 OFFICE O/P EST MOD 30 MIN: CPT | Performed by: INTERNAL MEDICINE

## 2025-04-28 PROCEDURE — 3074F SYST BP LT 130 MM HG: CPT | Performed by: INTERNAL MEDICINE

## 2025-04-28 PROCEDURE — 3078F DIAST BP <80 MM HG: CPT | Performed by: INTERNAL MEDICINE

## 2025-04-28 PROCEDURE — 1036F TOBACCO NON-USER: CPT | Performed by: INTERNAL MEDICINE

## 2025-04-28 ASSESSMENT — LIFESTYLE VARIABLES
AUDIT TOTAL SCORE: 0
AUDIT-C TOTAL SCORE: 0
HOW OFTEN DO YOU HAVE SIX OR MORE DRINKS ON ONE OCCASION: NEVER
HOW MANY STANDARD DRINKS CONTAINING ALCOHOL DO YOU HAVE ON A TYPICAL DAY: PATIENT DOES NOT DRINK
SKIP TO QUESTIONS 9-10: 1
HOW OFTEN DO YOU HAVE A DRINK CONTAINING ALCOHOL: NEVER
HAS A RELATIVE, FRIEND, DOCTOR, OR ANOTHER HEALTH PROFESSIONAL EXPRESSED CONCERN ABOUT YOUR DRINKING OR SUGGESTED YOU CUT DOWN: NO
HAVE YOU OR SOMEONE ELSE BEEN INJURED AS A RESULT OF YOUR DRINKING: NO

## 2025-04-28 ASSESSMENT — ENCOUNTER SYMPTOMS
OCCASIONAL FEELINGS OF UNSTEADINESS: 0
LOSS OF SENSATION IN FEET: 0
DEPRESSION: 0

## 2025-04-28 ASSESSMENT — PAIN SCALES - GENERAL: PAINLEVEL_OUTOF10: 0-NO PAIN

## 2025-04-28 NOTE — PROGRESS NOTES
[facility] [place]    Interventional Cardiology    History of present illness:  Very pleasant 63-year-old female with history of hypertension, mild coronary artery disease based on coronary calcium score that she had in 2021 which she had total score of 60.  Patient had in December 2, 2024 evaluation for confusion.  Back then there was no conclusion about that episode if it was syncope or confusion.  She underwent extensive workup including EKG that was within normal limit.  Troponin slightly elevated but flat, EEG and MRI within normal limits.  Patient also underwent monitor for at least 2 weeks that showed no major arrhythmia.  Patient returns to my office for follow-up.  Has been feeling overall okay.  Denies any chest pain shortness of breath palpitation dizziness sidedness or syncope.    Medical History[1]    Surgical History[2]    Allergies[3]     reports that she has never smoked. She has never been exposed to tobacco smoke. She has never used smokeless tobacco. She reports that she does not drink alcohol and does not use drugs.    Family History[4]    Patient's Medications   New Prescriptions    No medications on file   Previous Medications    ASPIRIN 81 MG EC TABLET    Take 1 tablet (81 mg) by mouth once daily.    CHLORTHALIDONE (HYGROTON) 25 MG TABLET    Take 0.5 tablets (12.5 mg) by mouth once daily.    CHOLECALCIFEROL (VITAMIN D3) 25 MCG (1000 UT) TABLET    Take 1 tablet (1,000 Units) by mouth once daily.    LOSARTAN (COZAAR) 100 MG TABLET    Take 1 tablet (100 mg) by mouth once daily.    METOPROLOL SUCCINATE XL (TOPROL-XL) 50 MG 24 HR TABLET    Take 1 tablet (50 mg) by mouth once daily.    ROSUVASTATIN (CRESTOR) 40 MG TABLET    Take 1 tablet (40 mg) by mouth once daily.   Modified Medications    No medications on file   Discontinued Medications    No medications on file       Objective   Physical Exam  General: Patient in no acute distress   HEENT: Atraumatic  normocephalic.  Neck: Supple, jugular venous pressure within normal limit.  No bruits  Lungs: Clear to auscultation bilaterally  Cardiovascular: Regular rate and rhythm, normal heart sounds, no murmurs rubs or gallops  Abdomen: Soft nontender nondistended.  Normal bowel sounds.  Extremities: Warm to touch, no edema.      Lab Review   No visits with results within 2 Month(s) from this visit.   Latest known visit with results is:   Hospital Outpatient Visit on 02/14/2025   Component Date Value    Case Report 02/14/2025                      Value:Surgical Pathology                                Case: [id number]                                  Authorizing Provider:  Mohinder Mason DO           Collected:           02/14/2025 0749              Ordering Location:     [facility]   Received:            02/14/2025 1402              Pathologist:           Davis Oneill MD                                                                  Specimen:    COLON - TRANSVERSE POLYP                                                                   FINAL DIAGNOSIS 02/14/2025                      Value:A. 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       Clinical History 02/14/2025                      Value:Positive colorectal cancer screening using Cologuard test [R19.5]       Gross Description 02/14/2025                      Value:A: Received in formalin, labeled with the patient's name and hospital number, are multiple fragments of tan, soft tissue admixed with organic material aggregating to 4.0 x 1.0 x 0.4 cm. The specimen is submitted in toto in three cassettes.  DMB             Assessment/Plan   Problem List[5]   Very pleasant 63-year-old female with history of  hypertension, mild coronary artery disease based on coronary calcium score that she had in 2021 which she had total score of 60.  Patient had in December 2, 2024 evaluation for confusion.  Back then there was no conclusion about that episode if it was syncope or confusion.  She underwent extensive workup including EKG that was within normal limit.  Troponin slightly elevated but flat, EEG and MRI within normal limits.  Patient also underwent monitor for at least 2 weeks that showed no major arrhythmia.  Patient returns to my office for follow-up.  Has been feeling overall okay.  Denies any chest pain shortness of breath palpitation dizziness sidedness or syncope.    Blood pressure runs on the low normal I will stop chlorthalidone to see if that helps slightly with the blood pressure since she had that confusion/syncopal episode in the past.  If her blood pressure goes higher then to restart it again.  Continue losartan metoprolol.    In terms of slightly elevated troponin that she had, we will arrange for treadmill stress test since patient had mild calcification in the LAD territory although doubt she has any ischemia and she has been physically active.    Otherwise she is on optimal medical therapy continue current medications we will follow-up in 6 months.    Frederick Maritnez MD              [1]   Past Medical History:  Diagnosis Date    Abnormal thyroid function test 01/11/2022    Arteriosclerosis of coronary artery 09/14/2022    Hypercholesterolemia 02/15/2019    Hyperproteinemia 05/13/2020    Hypertension 02/15/2019   [2]   Past Surgical History:  Procedure Laterality Date    CATARACT EXTRACTION Bilateral    [3]   Allergies  Allergen Reactions    Latex Rash   [4]   Family History  Problem Relation Name Age of Onset    Heart disease Mother      Heart disease Father      Breast cancer Sister     [5]   Patient Active Problem List  Diagnosis    Arteriosclerosis of coronary artery    Hyperproteinemia     Hypercholesterolemia    Hypertension    Inflammatory arthritis    Pseudophakia of both eyes    Altered mental status, unspecified    Leukocytosis    Hypokalemia    Acute kidney failure    Dry eyes, bilateral

## 2025-04-28 NOTE — PATIENT INSTRUCTIONS
Stop chlorthalidone.    Call centralized scheduling to arrange for treadmill stress test.    If your blood pressure goes above 130/80 then go back to full tablet of hydrochlorothiazide.    Otherwise I will see you in 6 months.

## 2025-05-07 ENCOUNTER — HOSPITAL ENCOUNTER (OUTPATIENT)
Dept: CARDIOLOGY | Facility: HOSPITAL | Age: 65
Discharge: HOME | End: 2025-05-07
Payer: COMMERCIAL

## 2025-05-07 DIAGNOSIS — I25.10 ARTERIOSCLEROSIS OF CORONARY ARTERY: ICD-10-CM

## 2025-05-07 DIAGNOSIS — R55 SYNCOPE AND COLLAPSE: ICD-10-CM

## 2025-05-07 PROCEDURE — 93017 CV STRESS TEST TRACING ONLY: CPT

## 2025-05-07 PROCEDURE — 93016 CV STRESS TEST SUPVJ ONLY: CPT | Performed by: INTERNAL MEDICINE

## 2025-05-07 PROCEDURE — 93018 CV STRESS TEST I&R ONLY: CPT | Performed by: INTERNAL MEDICINE

## 2025-05-20 ENCOUNTER — TELEPHONE (OUTPATIENT)
Facility: CLINIC | Age: 65
End: 2025-05-20
Payer: COMMERCIAL

## 2025-05-20 NOTE — TELEPHONE ENCOUNTER
----- Message from Frederick Martinez sent at 5/19/2025  2:37 PM EDT -----  Tell patient that her stress test although was suboptimal still she was able to walk for roughly 6 minutes and she had no chest pain.  I am okay with the result.  Let me see her in 4 weeks  ----- Message -----  From: Neisha, Syngo - Cardiology Results In  Sent: 5/7/2025   2:12 PM EDT  To: Frederick Martinez MD

## 2025-05-20 NOTE — TELEPHONE ENCOUNTER
Tried calling patient yesterday and today regarding stress test. No answer. Left message both times for patient to call office.

## 2025-05-21 ENCOUNTER — TELEPHONE (OUTPATIENT)
Facility: CLINIC | Age: 65
End: 2025-05-21
Payer: COMMERCIAL

## 2025-05-21 ENCOUNTER — APPOINTMENT (OUTPATIENT)
Dept: NEUROLOGY | Facility: HOSPITAL | Age: 65
End: 2025-05-21
Payer: COMMERCIAL

## 2025-05-21 NOTE — TELEPHONE ENCOUNTER
----- Message from Frederick Martinez sent at 5/19/2025  2:37 PM EDT -----  Tell patient that her stress test although was suboptimal still she was able to walk for roughly 6 minutes and she had no chest pain.  I am okay with the result.  Let me see her in 4 weeks  ----- Message -----  From: Neisah, Syngo - Cardiology Results In  Sent: 5/7/2025   2:12 PM EDT  To: Frederick Martinez MD     Yes

## 2025-05-23 DIAGNOSIS — I10 PRIMARY HYPERTENSION: ICD-10-CM

## 2025-05-23 RX ORDER — LOSARTAN POTASSIUM 100 MG/1
100 TABLET ORAL DAILY
Qty: 90 TABLET | Refills: 1 | Status: SHIPPED | OUTPATIENT
Start: 2025-05-23

## 2025-05-23 NOTE — TELEPHONE ENCOUNTER
Pt left message requesting refills on metoprolol, chlorthalidone and losartan. Chart shows metoprolol and chlorthalidone were refilled by Dr. Matson-confirmed with pharmacy. Will need refill on losartan-sent to Dr. Martinez. Will inform patient.

## 2025-05-29 RX ORDER — LOSARTAN POTASSIUM 100 MG/1
100 TABLET ORAL DAILY
Qty: 90 TABLET | Refills: 3 | Status: SHIPPED | OUTPATIENT
Start: 2025-05-29 | End: 2025-08-27

## 2025-06-04 DIAGNOSIS — E78.00 HYPERCHOLESTEROLEMIA: ICD-10-CM

## 2025-06-04 NOTE — TELEPHONE ENCOUNTER
Please send the attached prescription to the patient's pharmacy as soon as possible. Thank you!    Requested Prescriptions     Pending Prescriptions Disp Refills    rosuvastatin (Crestor) 40 mg tablet 90 tablet 3     Sig: Take 1 tablet (40 mg) by mouth once daily.

## 2025-06-06 RX ORDER — ROSUVASTATIN CALCIUM 40 MG/1
40 TABLET, COATED ORAL DAILY
Qty: 90 TABLET | Refills: 3 | Status: SHIPPED | OUTPATIENT
Start: 2025-06-06

## 2025-06-17 ENCOUNTER — APPOINTMENT (OUTPATIENT)
Dept: CARDIOLOGY | Facility: CLINIC | Age: 65
End: 2025-06-17
Payer: COMMERCIAL

## 2025-06-17 VITALS
SYSTOLIC BLOOD PRESSURE: 116 MMHG | WEIGHT: 142 LBS | DIASTOLIC BLOOD PRESSURE: 70 MMHG | RESPIRATION RATE: 16 BRPM | OXYGEN SATURATION: 99 % | BODY MASS INDEX: 25.97 KG/M2 | HEART RATE: 66 BPM

## 2025-06-17 DIAGNOSIS — E78.00 HYPERCHOLESTEROLEMIA: ICD-10-CM

## 2025-06-17 DIAGNOSIS — I25.10 ARTERIOSCLEROSIS OF CORONARY ARTERY: Primary | ICD-10-CM

## 2025-06-17 DIAGNOSIS — I10 PRIMARY HYPERTENSION: ICD-10-CM

## 2025-06-17 PROCEDURE — 99214 OFFICE O/P EST MOD 30 MIN: CPT | Performed by: INTERNAL MEDICINE

## 2025-06-17 PROCEDURE — 3074F SYST BP LT 130 MM HG: CPT | Performed by: INTERNAL MEDICINE

## 2025-06-17 PROCEDURE — 3078F DIAST BP <80 MM HG: CPT | Performed by: INTERNAL MEDICINE

## 2025-06-17 ASSESSMENT — ENCOUNTER SYMPTOMS
DEPRESSION: 0
OCCASIONAL FEELINGS OF UNSTEADINESS: 0
LOSS OF SENSATION IN FEET: 0

## 2025-06-17 ASSESSMENT — PAIN SCALES - GENERAL: PAINLEVEL_OUTOF10: 0-NO PAIN

## 2025-06-17 NOTE — PROGRESS NOTES
[facility] [place]    Interventional Cardiology    History of present illness:  Very pleasant 64-year-old female with history of hypertension, mild coronary artery disease based on coronary calcium score that she had in 2021 which she had total score of 60.  Patient had in December 2, 2024 evaluation for confusion.  Back then there was no conclusion about that episode if it was syncope or confusion.  She underwent extensive workup including EKG that was within normal limit.  Troponin slightly elevated but flat, EEG and MRI within normal limits.  Patient also underwent monitor for at least 2 weeks that showed no major arrhythmia.  Treadmill stress test showed no ischemia.  Borderline functional capacity.  Patient also had chest CT in December 2024 that I reviewed myself today that showed no evidence of calcification in her coronaries likely calcium score would be 0.  Patient returns to my office for follow-up.  Feeling overall good.  Denies having chest pain or shortness of breath.  No palpitation dizziness lightheadedness or syncope.     Medical History[1]    Surgical History[2]    Allergies[3]     reports that she has never smoked. She has never been exposed to tobacco smoke. She has never used smokeless tobacco. She reports that she does not drink alcohol and does not use drugs.    Family History[4]    Patient's Medications   New Prescriptions    No medications on file   Previous Medications    ASPIRIN 81 MG EC TABLET    Take 1 tablet (81 mg) by mouth once daily.    CHLORTHALIDONE (HYGROTON) 25 MG TABLET    Take 0.5 tablets (12.5 mg) by mouth once daily.    CHOLECALCIFEROL (VITAMIN D3) 25 MCG (1000 UT) TABLET    Take 1 tablet (1,000 Units) by mouth once daily.    LOSARTAN (COZAAR) 100 MG TABLET    TAKE ONE TABLET BY MOUTH once DAILY    LOSARTAN (COZAAR) 100 MG TABLET    Take 1 tablet (100 mg) by mouth once daily.    METOPROLOL SUCCINATE XL (TOPROL-XL) 50 MG 24 HR TABLET     Take 1 tablet (50 mg) by mouth once daily.    ROSUVASTATIN (CRESTOR) 40 MG TABLET    Take 1 tablet (40 mg) by mouth once daily.   Modified Medications    No medications on file   Discontinued Medications    No medications on file       Objective   Physical Exam  General: Patient in no acute distress   HEENT: Atraumatic normocephalic.  Neck: Supple, jugular venous pressure within normal limit.  No bruits  Lungs: Clear to auscultation bilaterally  Cardiovascular: Regular rate and rhythm, normal heart sounds, no murmurs rubs or gallops  Abdomen: Soft nontender nondistended.  Normal bowel sounds.  Extremities: Warm to touch, no edema.    Lab Review   No visits with results within 2 Month(s) from this visit.   Latest known visit with results is:   Hospital Outpatient Visit on 02/14/2025   Component Date Value    Case Report 02/14/2025                      Value:Surgical Pathology                                Case: [id number]                                  Authorizing Provider:  Mohinder Mason DO           Collected:           02/14/2025 0749              Ordering Location:     [facility]   Received:            02/14/2025 1402              Pathologist:           Davis Oneill MD                                                                  Specimen:    COLON - TRANSVERSE POLYP                                                                   FINAL DIAGNOSIS 02/14/2025                      Value:A. 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       Clinical History 02/14/2025                      Value:Positive colorectal cancer screening using Cologuard test [R19.5]       Gross Description 02/14/2025                      Value:A: Received in  formalin, labeled with the patient's name and hospital number, are multiple fragments of tan, soft tissue admixed with organic material aggregating to 4.0 x 1.0 x 0.4 cm. The specimen is submitted in toto in three cassettes.  DMB             Assessment/Plan   Problem List[5]   Very pleasant 64-year-old female with history of hypertension, mild coronary artery disease based on coronary calcium score that she had in 2021 which she had total score of 60.  Patient had in December 2, 2024 evaluation for confusion.  Back then there was no conclusion about that episode if it was syncope or confusion.  She underwent extensive workup including EKG that was within normal limit.  Troponin slightly elevated but flat, EEG and MRI within normal limits.  Patient also underwent monitor for at least 2 weeks that showed no major arrhythmia.  Treadmill stress test showed no ischemia.  Borderline functional capacity.  Patient also had chest CT in December 2024 that I reviewed myself today that showed no evidence of calcification in her coronaries likely calcium score would be 0.  Patient returns to my office for follow-up.  Feeling overall good.  Denies having chest pain or shortness of breath.  No palpitation dizziness lightheadedness or syncope.     Patient on optimal medical therapy.  Blood pressure and heart rate well-controlled.  Recommend to continue chlorthalidone as well as losartan and metoprolol.  She has been taking the chlorthalidone 12.5 mg maybe 4 times to 5 times a week monitoring her blood pressure closely.  The other way it says she is not on medical therapy LDL at target rosuvastatin 40 mg daily at bedtime.       Frederick Martinez MD              [1]   Past Medical History:  Diagnosis Date    Abnormal thyroid function test 01/11/2022    Arteriosclerosis of coronary artery 09/14/2022    Hypercholesterolemia 02/15/2019    Hyperproteinemia 05/13/2020    Hypertension 02/15/2019   [2]   Past Surgical History:  Procedure  Laterality Date    CATARACT EXTRACTION Bilateral    [3]   Allergies  Allergen Reactions    Latex Rash   [4]   Family History  Problem Relation Name Age of Onset    Heart disease Mother      Heart disease Father      Breast cancer Sister     [5]   Patient Active Problem List  Diagnosis    Arteriosclerosis of coronary artery    Hyperproteinemia    Hypercholesterolemia    Hypertension    Inflammatory arthritis    Pseudophakia of both eyes    Altered mental status, unspecified    Leukocytosis    Hypokalemia    Acute kidney failure    Dry eyes, bilateral

## 2025-07-10 ENCOUNTER — TELEPHONE (OUTPATIENT)
Dept: PRIMARY CARE | Facility: CLINIC | Age: 65
End: 2025-07-10
Payer: COMMERCIAL

## 2025-07-10 DIAGNOSIS — Z00.00 WELL ADULT EXAM: ICD-10-CM

## 2025-08-22 ENCOUNTER — OFFICE VISIT (OUTPATIENT)
Dept: URGENT CARE | Age: 65
End: 2025-08-22
Payer: COMMERCIAL

## 2025-08-22 VITALS
HEART RATE: 64 BPM | WEIGHT: 132.94 LBS | DIASTOLIC BLOOD PRESSURE: 75 MMHG | SYSTOLIC BLOOD PRESSURE: 145 MMHG | RESPIRATION RATE: 16 BRPM | BODY MASS INDEX: 24.31 KG/M2 | TEMPERATURE: 97.7 F | OXYGEN SATURATION: 96 %

## 2025-08-22 DIAGNOSIS — R03.0 ELEVATED BLOOD PRESSURE READING: ICD-10-CM

## 2025-08-22 DIAGNOSIS — J06.9 VIRAL URI: Primary | ICD-10-CM

## 2025-08-22 DIAGNOSIS — J02.9 SORE THROAT: ICD-10-CM

## 2025-08-22 LAB
POC HUMAN RHINOVIRUS PCR: NEGATIVE
POC INFLUENZA A VIRUS PCR: NEGATIVE
POC INFLUENZA B VIRUS PCR: NEGATIVE
POC RESPIRATORY SYNCYTIAL VIRUS PCR: NEGATIVE
POC SARS-COV-2 AG BINAX: NORMAL
POC STREPTOCOCCUS PYOGENES (GROUP A STREP) PCR: NEGATIVE

## 2025-08-22 RX ORDER — CETIRIZINE HYDROCHLORIDE 10 MG/1
10 TABLET ORAL DAILY
Qty: 7 TABLET | Refills: 0 | Status: SHIPPED | OUTPATIENT
Start: 2025-08-22 | End: 2025-08-29

## 2025-08-22 RX ORDER — FLUTICASONE PROPIONATE 50 MCG
1 SPRAY, SUSPENSION (ML) NASAL DAILY
Qty: 16 G | Refills: 0 | Status: SHIPPED | OUTPATIENT
Start: 2025-08-22 | End: 2025-09-21

## 2025-08-22 RX ORDER — CETIRIZINE HYDROCHLORIDE 10 MG/1
10 TABLET ORAL DAILY
Qty: 7 TABLET | Refills: 0 | Status: SHIPPED | OUTPATIENT
Start: 2025-08-22 | End: 2025-08-22

## 2025-08-22 RX ORDER — GUAIFENESIN 600 MG/1
1200 TABLET, EXTENDED RELEASE ORAL
Qty: 14 TABLET | Refills: 0 | Status: SHIPPED | OUTPATIENT
Start: 2025-08-22 | End: 2025-08-22

## 2025-08-22 RX ORDER — GUAIFENESIN 600 MG/1
1200 TABLET, EXTENDED RELEASE ORAL
Qty: 14 TABLET | Refills: 0 | Status: SHIPPED | OUTPATIENT
Start: 2025-08-22 | End: 2025-08-29

## 2025-08-22 RX ORDER — FLUTICASONE PROPIONATE 50 MCG
1 SPRAY, SUSPENSION (ML) NASAL DAILY
Qty: 16 G | Refills: 0 | Status: SHIPPED | OUTPATIENT
Start: 2025-08-22 | End: 2025-08-22

## 2025-08-26 DIAGNOSIS — I10 HYPERTENSION, UNSPECIFIED TYPE: ICD-10-CM

## 2025-08-26 DIAGNOSIS — I10 PRIMARY HYPERTENSION: ICD-10-CM

## 2025-08-27 RX ORDER — METOPROLOL SUCCINATE 50 MG/1
50 TABLET, EXTENDED RELEASE ORAL DAILY
Qty: 90 TABLET | Refills: 3 | Status: SHIPPED | OUTPATIENT
Start: 2025-08-27

## 2025-08-27 RX ORDER — LOSARTAN POTASSIUM 100 MG/1
100 TABLET ORAL DAILY
Qty: 90 TABLET | Refills: 3 | Status: SHIPPED | OUTPATIENT
Start: 2025-08-27 | End: 2025-11-25

## 2025-09-03 DIAGNOSIS — E78.00 HYPERCHOLESTEROLEMIA: ICD-10-CM

## 2025-09-05 RX ORDER — ROSUVASTATIN CALCIUM 40 MG/1
40 TABLET, COATED ORAL DAILY
Qty: 90 TABLET | Refills: 3 | Status: SHIPPED | OUTPATIENT
Start: 2025-09-05

## 2025-10-30 ENCOUNTER — APPOINTMENT (OUTPATIENT)
Dept: PRIMARY CARE | Facility: CLINIC | Age: 65
End: 2025-10-30
Payer: COMMERCIAL

## 2026-01-26 ENCOUNTER — APPOINTMENT (OUTPATIENT)
Dept: OPHTHALMOLOGY | Facility: CLINIC | Age: 66
End: 2026-01-26
Payer: COMMERCIAL